# Patient Record
Sex: FEMALE | Race: WHITE | Employment: OTHER | URBAN - METROPOLITAN AREA
[De-identification: names, ages, dates, MRNs, and addresses within clinical notes are randomized per-mention and may not be internally consistent; named-entity substitution may affect disease eponyms.]

---

## 2019-05-30 ENCOUNTER — HOSPITAL ENCOUNTER (OUTPATIENT)
Dept: GENERAL RADIOLOGY | Age: 69
Discharge: HOME | End: 2019-05-30
Payer: MEDICARE

## 2019-05-30 DIAGNOSIS — M79.641 PAIN IN RIGHT HAND: ICD-10-CM

## 2019-05-30 DIAGNOSIS — M25.511 PAIN IN RIGHT SHOULDER: ICD-10-CM

## 2019-05-30 DIAGNOSIS — M25.512 PAIN IN LEFT SHOULDER: ICD-10-CM

## 2019-05-30 DIAGNOSIS — M79.642 PAIN OF LEFT HAND: ICD-10-CM

## 2019-05-30 PROCEDURE — 73030 X-RAY EXAM OF SHOULDER: CPT

## 2019-05-30 PROCEDURE — 73130 X-RAY EXAM OF HAND: CPT

## 2021-05-05 ENCOUNTER — TRANSCRIBE ORDER (OUTPATIENT)
Dept: REGISTRATION | Age: 71
End: 2021-05-05

## 2021-05-05 ENCOUNTER — HOSPITAL ENCOUNTER (OUTPATIENT)
Dept: GENERAL RADIOLOGY | Age: 71
Discharge: HOME | End: 2021-05-05
Payer: MEDICARE

## 2021-05-05 DIAGNOSIS — M25.569 PAIN, KNEE: ICD-10-CM

## 2021-05-05 DIAGNOSIS — M25.569 PAIN, KNEE: Primary | ICD-10-CM

## 2021-05-05 DIAGNOSIS — M25.569 KNEE PAIN: ICD-10-CM

## 2021-05-05 PROCEDURE — 73562 X-RAY EXAM OF KNEE 3: CPT

## 2021-09-21 ENCOUNTER — APPOINTMENT (OUTPATIENT)
Dept: GENERAL RADIOLOGY | Age: 71
DRG: 311 | End: 2021-09-21
Attending: EMERGENCY MEDICINE
Payer: MEDICARE

## 2021-09-21 ENCOUNTER — HOSPITAL ENCOUNTER (INPATIENT)
Age: 71
LOS: 1 days | Discharge: HOME HEALTH | DRG: 311 | End: 2021-09-24
Attending: EMERGENCY MEDICINE | Admitting: FAMILY MEDICINE
Payer: MEDICARE

## 2021-09-21 DIAGNOSIS — R06.02 SOB (SHORTNESS OF BREATH): ICD-10-CM

## 2021-09-21 DIAGNOSIS — I44.7 LBBB (LEFT BUNDLE BRANCH BLOCK): ICD-10-CM

## 2021-09-21 DIAGNOSIS — R07.89 OTHER CHEST PAIN: ICD-10-CM

## 2021-09-21 DIAGNOSIS — E78.2 MIXED HYPERLIPIDEMIA: ICD-10-CM

## 2021-09-21 DIAGNOSIS — E87.6 HYPOKALEMIA: Primary | ICD-10-CM

## 2021-09-21 DIAGNOSIS — I21.4 NSTEMI (NON-ST ELEVATED MYOCARDIAL INFARCTION) (HCC): ICD-10-CM

## 2021-09-21 DIAGNOSIS — R77.8 ELEVATED TROPONIN: ICD-10-CM

## 2021-09-21 DIAGNOSIS — R77.8 TROPONIN LEVEL ELEVATED: ICD-10-CM

## 2021-09-21 DIAGNOSIS — G20 PARKINSON DISEASE (HCC): ICD-10-CM

## 2021-09-21 DIAGNOSIS — I10 ESSENTIAL HYPERTENSION: ICD-10-CM

## 2021-09-21 LAB
ALBUMIN SERPL-MCNC: 4.1 G/DL (ref 3.2–4.6)
ALBUMIN/GLOB SERPL: 1.2 {RATIO} (ref 1.2–3.5)
ALP SERPL-CCNC: 73 U/L (ref 50–130)
ALT SERPL-CCNC: 23 U/L (ref 12–65)
ANION GAP SERPL CALC-SCNC: 11 MMOL/L (ref 7–16)
AST SERPL-CCNC: 19 U/L (ref 15–37)
BASOPHILS # BLD: 0 K/UL (ref 0–0.2)
BASOPHILS NFR BLD: 0 % (ref 0–2)
BILIRUB SERPL-MCNC: 0.3 MG/DL (ref 0.2–1.1)
BUN SERPL-MCNC: 19 MG/DL (ref 8–23)
CALCIUM SERPL-MCNC: 9 MG/DL (ref 8.3–10.4)
CHLORIDE SERPL-SCNC: 103 MMOL/L (ref 98–107)
CO2 SERPL-SCNC: 26 MMOL/L (ref 21–32)
COVID-19 RAPID TEST, COVR: NOT DETECTED
CREAT SERPL-MCNC: 0.91 MG/DL (ref 0.6–1)
D DIMER PPP FEU-MCNC: <0.27 UG/ML(FEU)
DIFFERENTIAL METHOD BLD: NORMAL
EOSINOPHIL # BLD: 0.1 K/UL (ref 0–0.8)
EOSINOPHIL NFR BLD: 1 % (ref 0.5–7.8)
ERYTHROCYTE [DISTWIDTH] IN BLOOD BY AUTOMATED COUNT: 13.3 % (ref 11.9–14.6)
GLOBULIN SER CALC-MCNC: 3.3 G/DL (ref 2.3–3.5)
GLUCOSE SERPL-MCNC: 134 MG/DL (ref 65–100)
HCT VFR BLD AUTO: 42.5 % (ref 35.8–46.3)
HGB BLD-MCNC: 14.3 G/DL (ref 11.7–15.4)
IMM GRANULOCYTES # BLD AUTO: 0 K/UL (ref 0–0.5)
IMM GRANULOCYTES NFR BLD AUTO: 0 % (ref 0–5)
LYMPHOCYTES # BLD: 2.8 K/UL (ref 0.5–4.6)
LYMPHOCYTES NFR BLD: 26 % (ref 13–44)
MAGNESIUM SERPL-MCNC: 2.2 MG/DL (ref 1.8–2.4)
MCH RBC QN AUTO: 30.8 PG (ref 26.1–32.9)
MCHC RBC AUTO-ENTMCNC: 33.6 G/DL (ref 31.4–35)
MCV RBC AUTO: 91.4 FL (ref 79.6–97.8)
MONOCYTES # BLD: 0.7 K/UL (ref 0.1–1.3)
MONOCYTES NFR BLD: 6 % (ref 4–12)
NEUTS SEG # BLD: 7.1 K/UL (ref 1.7–8.2)
NEUTS SEG NFR BLD: 66 % (ref 43–78)
NRBC # BLD: 0 K/UL (ref 0–0.2)
PLATELET # BLD AUTO: 217 K/UL (ref 150–450)
PMV BLD AUTO: 10.3 FL (ref 9.4–12.3)
POTASSIUM SERPL-SCNC: 2.9 MMOL/L (ref 3.5–5.1)
PROT SERPL-MCNC: 7.4 G/DL (ref 6.3–8.2)
RBC # BLD AUTO: 4.65 M/UL (ref 4.05–5.2)
SODIUM SERPL-SCNC: 140 MMOL/L (ref 136–145)
SOURCE, COVRS: NORMAL
TROPONIN-HIGH SENSITIVITY: 163.9 PG/ML (ref 0–14)
TROPONIN-HIGH SENSITIVITY: 184.6 PG/ML (ref 0–14)
WBC # BLD AUTO: 10.7 K/UL (ref 4.3–11.1)

## 2021-09-21 PROCEDURE — 96365 THER/PROPH/DIAG IV INF INIT: CPT

## 2021-09-21 PROCEDURE — 74011250637 HC RX REV CODE- 250/637: Performed by: EMERGENCY MEDICINE

## 2021-09-21 PROCEDURE — 93005 ELECTROCARDIOGRAM TRACING: CPT | Performed by: EMERGENCY MEDICINE

## 2021-09-21 PROCEDURE — 83735 ASSAY OF MAGNESIUM: CPT

## 2021-09-21 PROCEDURE — 85379 FIBRIN DEGRADATION QUANT: CPT

## 2021-09-21 PROCEDURE — 87635 SARS-COV-2 COVID-19 AMP PRB: CPT

## 2021-09-21 PROCEDURE — 85025 COMPLETE CBC W/AUTO DIFF WBC: CPT

## 2021-09-21 PROCEDURE — 71045 X-RAY EXAM CHEST 1 VIEW: CPT

## 2021-09-21 PROCEDURE — 74011250636 HC RX REV CODE- 250/636: Performed by: EMERGENCY MEDICINE

## 2021-09-21 PROCEDURE — 80053 COMPREHEN METABOLIC PANEL: CPT

## 2021-09-21 PROCEDURE — 99285 EMERGENCY DEPT VISIT HI MDM: CPT

## 2021-09-21 PROCEDURE — 84145 PROCALCITONIN (PCT): CPT

## 2021-09-21 PROCEDURE — 96366 THER/PROPH/DIAG IV INF ADDON: CPT

## 2021-09-21 PROCEDURE — 84484 ASSAY OF TROPONIN QUANT: CPT

## 2021-09-21 PROCEDURE — 94762 N-INVAS EAR/PLS OXIMTRY CONT: CPT

## 2021-09-21 RX ORDER — POTASSIUM CHLORIDE 14.9 MG/ML
20 INJECTION INTRAVENOUS
Status: COMPLETED | OUTPATIENT
Start: 2021-09-21 | End: 2021-09-21

## 2021-09-21 RX ORDER — POTASSIUM CHLORIDE 20 MEQ/1
40 TABLET, EXTENDED RELEASE ORAL
Status: COMPLETED | OUTPATIENT
Start: 2021-09-21 | End: 2021-09-21

## 2021-09-21 RX ORDER — SODIUM CHLORIDE 0.9 % (FLUSH) 0.9 %
5-10 SYRINGE (ML) INJECTION AS NEEDED
Status: DISCONTINUED | OUTPATIENT
Start: 2021-09-21 | End: 2021-09-24 | Stop reason: HOSPADM

## 2021-09-21 RX ORDER — SODIUM CHLORIDE 0.9 % (FLUSH) 0.9 %
5-10 SYRINGE (ML) INJECTION EVERY 8 HOURS
Status: DISCONTINUED | OUTPATIENT
Start: 2021-09-21 | End: 2021-09-24 | Stop reason: HOSPADM

## 2021-09-21 RX ORDER — GUAIFENESIN 100 MG/5ML
324 LIQUID (ML) ORAL
Status: DISPENSED | OUTPATIENT
Start: 2021-09-21 | End: 2021-09-22

## 2021-09-21 RX ADMIN — POTASSIUM CHLORIDE 20 MEQ: 14.9 INJECTION, SOLUTION INTRAVENOUS at 20:47

## 2021-09-21 RX ADMIN — POTASSIUM CHLORIDE 40 MEQ: 20 TABLET, EXTENDED RELEASE ORAL at 23:36

## 2021-09-21 RX ADMIN — Medication 5 ML: at 23:38

## 2021-09-21 NOTE — ED PROVIDER NOTES
41-year-old female with history of anxiety, depression, diabetes, CHF, hypertension, Parkinson's disease, polyarthritis presents with complaint of worsening shortness of breath, substernal chest pain without radiation. Patient also reports history of COPD. Patient denies any recent sick contacts. Patient denies receiving COVID-19 vaccine. Rate symptoms is moderate. O2 sat stable at 97% room air. The history is provided by the patient. No  was used. Shortness of Breath  This is a recurrent problem. The problem occurs continuously. Associated symptoms include cough, wheezing and chest pain. Pertinent negatives include no fever, no headaches, no coryza, no rhinorrhea, no sore throat, no swollen glands, no sputum production, no hemoptysis, no PND, no orthopnea, no syncope, no vomiting, no abdominal pain, no rash, no leg pain, no leg swelling and no claudication. Anxiety   Associated symptoms include cough and shortness of breath. Pertinent negatives include no abdominal pain, no claudication, no fever, no headaches, no hemoptysis, no leg pain, no orthopnea, no PND, no sputum production, no syncope and no vomiting. Past Medical History:   Diagnosis Date    Anxiety     Cancer ([facility] [address]     melanoma    Depression     Diabetes ([facility] [address]     Heart failure ([facility] [address]     Hypertension     Parkinson's disease ([facility] [address]     Peripheral neuropathy     Polyarthritis     Sjogren's disease ([facility] [address]        Past Surgical History:   Procedure Laterality Date    HX APPENDECTOMY      HX CHOLECYSTECTOMY      HX TOTAL VAGINAL HYSTERECTOMY           History reviewed. No pertinent family history.     Social History     Socioeconomic History    Marital status:      Spouse name: Not on file    Number of children: Not on file    Years of education: Not on file    Highest education level: Not on file   Occupational History    Not on file   Tobacco Use    Smoking status: Former Smoker    Smokeless tobacco: Never Used   Substance and Sexual Activity    Alcohol use: Not Currently    Drug use: Never    Sexual activity: Not on file   Other Topics Concern    Not on file   Social History Narrative    Not on file     Social Determinants of Health     Financial Resource Strain:     Difficulty of Paying Living Expenses:    Food Insecurity:     Worried About Running Out of Food in the Last Year:     [address] in the Last Year:    Transportation Needs:     Lack of Transportation (Medical):  Lack of Transportation (Non-Medical):    Physical Activity:     Days of Exercise per Week:     Minutes of Exercise per Session:    Stress:     Feeling of Stress :    Social Connections:     Frequency of Communication with Friends and Family:     Frequency of Social Gatherings with Friends and Family:     Attends Je[employer] witness Services:     Active Member of Clubs or Organizations:     Attends Club or Organization Meetings:     Marital Status:    Intimate Partner Violence:     Fear of Current or Ex-Partner:     Emotionally Abused:     Physically Abused:     Sexually Abused: ALLERGIES: Codeine and Prednisone    Review of Systems   Constitutional: Negative for fever. HENT: Negative for rhinorrhea and sore throat. Respiratory: Positive for cough, shortness of breath and wheezing. Negative for hemoptysis and sputum production. Cardiovascular: Positive for chest pain. Negative for orthopnea, claudication, leg swelling, syncope and PND. Gastrointestinal: Negative for abdominal pain and vomiting. Skin: Negative for rash. Neurological: Negative for headaches. Vitals:    09/21/21 1758   BP: (!) 147/65   Pulse: 72   Resp: 14   Temp: 97.5 °F (36.4 °C)   SpO2: 98%   Weight: 68 kg (150 lb)   Height: 5' 3\" (1.6 m)            Physical Exam  Vitals and nursing note reviewed. Constitutional:       Appearance: She is well-developed. HENT:      Head: Normocephalic.       Mouth/Throat: Mouth: Mucous membranes are moist.   Eyes:      Extraocular Movements: Extraocular movements intact. Pupils: Pupils are equal, round, and reactive to light. Cardiovascular:      Rate and Rhythm: Normal rate. Pulses: Normal pulses. Heart sounds: Normal heart sounds. Pulmonary:      Effort: Pulmonary effort is normal.      Breath sounds: Normal breath sounds. Abdominal:      General: Bowel sounds are normal.      Palpations: Abdomen is soft. Tenderness: There is no abdominal tenderness. Musculoskeletal:         General: Normal range of motion. Cervical back: Normal range of motion. Comments: No LE edema. No calf TTP. Skin:     General: Skin is warm. Findings: No erythema. Neurological:      General: No focal deficit present. Mental Status: She is alert and oriented to person, place, and time. Motor: No weakness. MDM  Number of Diagnoses or Management Options  Hypokalemia: new and requires workup  LBBB (left bundle branch block)  SOB (shortness of breath): new and requires workup  Diagnosis management comments: EKG with left bundle branch block. No previous EKG for comparison. Initial troponin 184.6. Repeat 2-hour troponin trending down to 164. Patient poor historian. Denies any active chest pain. Vital signs stable. O2 sat 97% on room air. Potassium noted to be 2.9. Will replete with IV supplementation. Chest x-ray clear. Covid swab negative. D-dimer unremarkable. Will discuss case with cardiology. Aspirin ordered. Dr. Bernabe Connor on call for Cardiology.  Requests Hospitalist admit and someone from Cards team can see in am.        Amount and/or Complexity of Data Reviewed  Clinical lab tests: ordered and reviewed  Tests in the radiology section of CPT®: ordered and reviewed  Tests in the medicine section of CPT®: ordered and reviewed  Review and summarize past medical records: yes  Discuss the patient with other providers: yes  Independent visualization of images, tracings, or specimens: yes    Risk of Complications, Morbidity, and/or Mortality  Presenting problems: high  Diagnostic procedures: moderate  Management options: moderate    Patient Progress  Patient progress: stable    ED Course as of Sep 21 2226   Tue Sep 21, 2021   1922 Potassium(!): 2.9 [DF]   1928 CXR Findings:    Lungs/pleura:No evidence of consolidation or effusion. Small granulomas  Vascularity:Normal Vasculature  Cardiac:Normal size cardiac silhouette  Mediastinum:Aortic calcifications  Bones:No obvious acute fracture     IMPRESSION:   No acute process      [DF]   1934 Troponin-High Sensitivity(!!): 184.6 [DF]   2124 COVID-19 rapid test: Not detected [DF]   2124 D DIMER: <0.27 [DF]   2225 Troponin-High Sensitivity(!!): 163.9 [DF]      ED Course User Index  [DF] Elodia Zapata MD       EKG    Date/Time: 9/21/2021 6:06 PM  Performed by: Elodia Zapata MD  Authorized by:  Elodia Zapata MD     ECG reviewed by ED Physician in the absence of a cardiologist: yes    Rate:     ECG rate:  68    ECG rate assessment: normal    Rhythm:     Rhythm: sinus rhythm    Ectopy:     Ectopy: none    QRS:     QRS axis:  Left  Conduction:     Conduction: abnormal      Abnormal conduction: complete LBBB                     Results Include:    Recent Results (from the past 24 hour(s))   EKG, 12 LEAD, INITIAL    Collection Time: 09/21/21  5:57 PM   Result Value Ref Range    Ventricular Rate 68 BPM    Atrial Rate 68 BPM    P-R Interval 148 ms    QRS Duration 132 ms    Q-T Interval 462 ms    QTC Calculation (Bezet) 491 ms    Calculated P Axis 36 degrees    Calculated R Axis -37 degrees    Calculated T Axis 119 degrees    Diagnosis       Normal sinus rhythm  Left axis deviation  Left bundle branch block  Abnormal ECG  No previous ECGs available     CBC WITH AUTOMATED DIFF    Collection Time: 09/21/21  6:13 PM   Result Value Ref Range    WBC 10.7 4.3 - 11.1 K/uL    RBC 4.65 4.05 - 5.2 M/uL    HGB 14.3 11.7 - 15.4 g/dL    HCT 42.5 35.8 - 46.3 %    MCV 91.4 79.6 - 97.8 FL    MCH 30.8 26.1 - 32.9 PG    MCHC 33.6 31.4 - 35.0 g/dL    RDW 13.3 11.9 - 14.6 %    PLATELET 726 195 - 689 K/uL    MPV 10.3 9.4 - 12.3 FL    ABSOLUTE NRBC 0.00 0.0 - 0.2 K/uL    DF AUTOMATED      NEUTROPHILS 66 43 - 78 %    LYMPHOCYTES 26 13 - 44 %    MONOCYTES 6 4.0 - 12.0 %    EOSINOPHILS 1 0.5 - 7.8 %    BASOPHILS 0 0.0 - 2.0 %    IMMATURE GRANULOCYTES 0 0.0 - 5.0 %    ABS. NEUTROPHILS 7.1 1.7 - 8.2 K/UL    ABS. LYMPHOCYTES 2.8 0.5 - 4.6 K/UL    ABS. MONOCYTES 0.7 0.1 - 1.3 K/UL    ABS. EOSINOPHILS 0.1 0.0 - 0.8 K/UL    ABS. BASOPHILS 0.0 0.0 - 0.2 K/UL    ABS. IMM. GRANS. 0.0 0.0 - 0.5 K/UL   METABOLIC PANEL, COMPREHENSIVE    Collection Time: 09/21/21  6:13 PM   Result Value Ref Range    Sodium 140 136 - 145 mmol/L    Potassium 2.9 (L) 3.5 - 5.1 mmol/L    Chloride 103 98 - 107 mmol/L    CO2 26 21 - 32 mmol/L    Anion gap 11 7 - 16 mmol/L    Glucose 134 (H) 65 - 100 mg/dL    BUN 19 8 - 23 MG/DL    Creatinine 0.91 0.6 - 1.0 MG/DL    GFR est AA >60 >60 ml/min/1.73m2    GFR est non-AA >60 >60 ml/min/1.73m2    Calcium 9.0 8.3 - 10.4 MG/DL    Bilirubin, total 0.3 0.2 - 1.1 MG/DL    ALT (SGPT) 23 12 - 65 U/L    AST (SGOT) 19 15 - 37 U/L    Alk. phosphatase 73 50 - 130 U/L    Protein, total 7.4 6.3 - 8.2 g/dL    Albumin 4.1 3.2 - 4.6 g/dL    Globulin 3.3 2.3 - 3.5 g/dL    A-G Ratio 1.2 1.2 - 3.5     MAGNESIUM    Collection Time: 09/21/21  6:13 PM   Result Value Ref Range    Magnesium 2.2 1.8 - 2.4 mg/dL     Rusty Newsome MD; 9/21/2021 @7:26 PM Voice dictation software was used during the making of this note. This software is not perfect and grammatical and other typographical errors may be present.   This note has not been proofread for errors.  ===================================================================

## 2021-09-21 NOTE — ED TRIAGE NOTES
Pt presents to ED via EMS from home c/o shortness of breath for less than 24 hours. Hx of COPD/HTN/Anxiety. Pt speaking complete sentences. No retractions/nasal flaring noted. O2 RA sat 100%. Afebrile. EMS BP: 131/69, HR 73, LBBB.

## 2021-09-22 ENCOUNTER — APPOINTMENT (OUTPATIENT)
Dept: NON INVASIVE DIAGNOSTICS | Age: 71
DRG: 311 | End: 2021-09-22
Attending: INTERNAL MEDICINE
Payer: MEDICARE

## 2021-09-22 PROBLEM — R07.9 CHEST PAIN: Status: ACTIVE | Noted: 2021-09-22

## 2021-09-22 PROBLEM — E11.9 DM (DIABETES MELLITUS) (HCC): Status: ACTIVE | Noted: 2021-09-22

## 2021-09-22 PROBLEM — I10 HTN (HYPERTENSION): Status: ACTIVE | Noted: 2021-09-22

## 2021-09-22 PROBLEM — G20 PARKINSON DISEASE (HCC): Status: ACTIVE | Noted: 2021-09-22

## 2021-09-22 PROBLEM — E78.5 HLD (HYPERLIPIDEMIA): Status: ACTIVE | Noted: 2021-09-22

## 2021-09-22 PROBLEM — R32 URINARY INCONTINENCE: Status: ACTIVE | Noted: 2021-09-22

## 2021-09-22 PROBLEM — I24.8 DEMAND ISCHEMIA (HCC): Status: ACTIVE | Noted: 2021-09-22

## 2021-09-22 LAB
ANION GAP SERPL CALC-SCNC: 6 MMOL/L (ref 7–16)
ATRIAL RATE: 68 BPM
B PERT DNA SPEC QL NAA+PROBE: NOT DETECTED
BORDETELLA PARAPERTUSSIS PCR, BORPAR: NOT DETECTED
BUN SERPL-MCNC: 15 MG/DL (ref 8–23)
C PNEUM DNA SPEC QL NAA+PROBE: NOT DETECTED
CALCIUM SERPL-MCNC: 8.6 MG/DL (ref 8.3–10.4)
CALCULATED P AXIS, ECG09: 36 DEGREES
CALCULATED R AXIS, ECG10: -37 DEGREES
CALCULATED T AXIS, ECG11: 119 DEGREES
CHLORIDE SERPL-SCNC: 108 MMOL/L (ref 98–107)
CO2 SERPL-SCNC: 27 MMOL/L (ref 21–32)
CREAT SERPL-MCNC: 0.69 MG/DL (ref 0.6–1)
DIAGNOSIS, 93000: NORMAL
ECHO AO ASC DIAM: 2.8 CM
ECHO AO ROOT DIAM: 2.8 CM
ECHO AV AREA PEAK VELOCITY: 1.65 CM2
ECHO AV AREA VTI: 1.6 CM2
ECHO AV AREA/BSA PEAK VELOCITY: 1 CM2/M2
ECHO AV AREA/BSA VTI: 0.9 CM2/M2
ECHO AV MEAN GRADIENT: 4 MMHG
ECHO AV PEAK GRADIENT: 7 MMHG
ECHO AV PEAK VELOCITY: 135 CM/S
ECHO AV VTI: 28.5 CM
ECHO EST RA PRESSURE: 3 MMHG
ECHO LA AREA 2C: 19 CM2
ECHO LA AREA 4C: 20.9 CM2
ECHO LA MAJOR AXIS: 5.9 CM
ECHO LA MINOR AXIS: 3.45 CM
ECHO LV E' LATERAL VELOCITY: 8.19 CM/S
ECHO LV E' SEPTAL VELOCITY: 5.65 CM/S
ECHO LV EDV A4C: 84 CM3
ECHO LV ESV A4C: 33 CM3
ECHO LV INTERNAL DIMENSION DIASTOLIC: 4.21 CM (ref 3.9–5.3)
ECHO LV INTERNAL DIMENSION SYSTOLIC: 3.15 CM
ECHO LV IVSD: 1.04 CM (ref 0.6–0.9)
ECHO LV MASS 2D: 146.6 G (ref 67–162)
ECHO LV MASS INDEX 2D: 85.7 G/M2 (ref 43–95)
ECHO LV POSTERIOR WALL DIASTOLIC: 1.05 CM (ref 0.6–0.9)
ECHO LVOT DIAM: 1.8 CM
ECHO LVOT PEAK GRADIENT: 3 MMHG
ECHO LVOT VTI: 18 CM
ECHO MV A VELOCITY: 59.7 CM/S
ECHO MV E DECELERATION TIME (DT): 169 MS
ECHO MV E VELOCITY: 104 CM/S
ECHO MV E/A RATIO: 1.74
ECHO MV E/E' LATERAL: 12.7
ECHO MV E/E' RATIO (AVERAGED): 15.55
ECHO MV E/E' SEPTAL: 18.41
ECHO RIGHT VENTRICULAR SYSTOLIC PRESSURE (RVSP): 27 MMHG
ECHO RV INTERNAL DIMENSION: 2.92 CM
ECHO RV TAPSE: 1.7 CM (ref 1.5–2)
ECHO TV REGURGITANT MAX VELOCITY: 243 CM/S
ECHO TV REGURGITANT PEAK GRADIENT: 24 MMHG
FLUAV SUBTYP SPEC NAA+PROBE: NOT DETECTED
FLUBV RNA SPEC QL NAA+PROBE: NOT DETECTED
GLUCOSE SERPL-MCNC: 113 MG/DL (ref 65–100)
HADV DNA SPEC QL NAA+PROBE: NOT DETECTED
HCOV 229E RNA SPEC QL NAA+PROBE: NOT DETECTED
HCOV HKU1 RNA SPEC QL NAA+PROBE: NOT DETECTED
HCOV NL63 RNA SPEC QL NAA+PROBE: NOT DETECTED
HCOV OC43 RNA SPEC QL NAA+PROBE: NOT DETECTED
HMPV RNA SPEC QL NAA+PROBE: NOT DETECTED
HPIV1 RNA SPEC QL NAA+PROBE: NOT DETECTED
HPIV2 RNA SPEC QL NAA+PROBE: NOT DETECTED
HPIV3 RNA SPEC QL NAA+PROBE: NOT DETECTED
HPIV4 RNA SPEC QL NAA+PROBE: NOT DETECTED
M PNEUMO DNA SPEC QL NAA+PROBE: NOT DETECTED
P-R INTERVAL, ECG05: 148 MS
POTASSIUM SERPL-SCNC: 4.1 MMOL/L (ref 3.5–5.1)
PROCALCITONIN SERPL-MCNC: <0.05 NG/ML
Q-T INTERVAL, ECG07: 462 MS
QRS DURATION, ECG06: 132 MS
QTC CALCULATION (BEZET), ECG08: 491 MS
RSV RNA SPEC QL NAA+PROBE: NOT DETECTED
RV+EV RNA SPEC QL NAA+PROBE: NOT DETECTED
SARS-COV-2 PCR, COVPCR: NOT DETECTED
SODIUM SERPL-SCNC: 141 MMOL/L (ref 136–145)
TROPONIN-HIGH SENSITIVITY: 199.7 PG/ML (ref 0–14)
VENTRICULAR RATE, ECG03: 68 BPM

## 2021-09-22 PROCEDURE — 96372 THER/PROPH/DIAG INJ SC/IM: CPT

## 2021-09-22 PROCEDURE — 80048 BASIC METABOLIC PNL TOTAL CA: CPT

## 2021-09-22 PROCEDURE — 74011250636 HC RX REV CODE- 250/636: Performed by: INTERNAL MEDICINE

## 2021-09-22 PROCEDURE — 99218 HC RM OBSERVATION: CPT

## 2021-09-22 PROCEDURE — 84484 ASSAY OF TROPONIN QUANT: CPT

## 2021-09-22 PROCEDURE — 97530 THERAPEUTIC ACTIVITIES: CPT

## 2021-09-22 PROCEDURE — 74011000250 HC RX REV CODE- 250: Performed by: FAMILY MEDICINE

## 2021-09-22 PROCEDURE — 74011250636 HC RX REV CODE- 250/636: Performed by: FAMILY MEDICINE

## 2021-09-22 PROCEDURE — 97165 OT EVAL LOW COMPLEX 30 MIN: CPT

## 2021-09-22 PROCEDURE — 97161 PT EVAL LOW COMPLEX 20 MIN: CPT

## 2021-09-22 PROCEDURE — 74011250637 HC RX REV CODE- 250/637: Performed by: INTERNAL MEDICINE

## 2021-09-22 PROCEDURE — 97535 SELF CARE MNGMENT TRAINING: CPT

## 2021-09-22 PROCEDURE — 0202U NFCT DS 22 TRGT SARS-COV-2: CPT

## 2021-09-22 RX ORDER — METFORMIN HYDROCHLORIDE 500 MG/1
500 TABLET ORAL 2 TIMES DAILY WITH MEALS
COMMUNITY

## 2021-09-22 RX ORDER — MONTELUKAST SODIUM 10 MG/1
10 TABLET ORAL
COMMUNITY

## 2021-09-22 RX ORDER — POLYETHYLENE GLYCOL 3350 17 G/17G
17 POWDER, FOR SOLUTION ORAL DAILY PRN
Status: DISCONTINUED | OUTPATIENT
Start: 2021-09-22 | End: 2021-09-24 | Stop reason: HOSPADM

## 2021-09-22 RX ORDER — ALBUTEROL SULFATE 1.25 MG/3ML
1.25 SOLUTION RESPIRATORY (INHALATION)
COMMUNITY

## 2021-09-22 RX ORDER — AMLODIPINE BESYLATE 10 MG/1
10 TABLET ORAL DAILY
COMMUNITY

## 2021-09-22 RX ORDER — INDAPAMIDE 1.25 MG/1
1.25 TABLET, FILM COATED ORAL DAILY
COMMUNITY

## 2021-09-22 RX ORDER — METOPROLOL SUCCINATE 50 MG/1
50 TABLET, EXTENDED RELEASE ORAL DAILY
Status: DISCONTINUED | OUTPATIENT
Start: 2021-09-22 | End: 2021-09-24 | Stop reason: HOSPADM

## 2021-09-22 RX ORDER — DIPHENHYDRAMINE HCL 25 MG
25 CAPSULE ORAL
COMMUNITY

## 2021-09-22 RX ORDER — SPIRONOLACTONE 25 MG/1
25 TABLET ORAL DAILY
COMMUNITY

## 2021-09-22 RX ORDER — ENOXAPARIN SODIUM 100 MG/ML
40 INJECTION SUBCUTANEOUS DAILY
Status: DISCONTINUED | OUTPATIENT
Start: 2021-09-22 | End: 2021-09-24 | Stop reason: HOSPADM

## 2021-09-22 RX ORDER — ALBUTEROL SULFATE 90 UG/1
2 AEROSOL, METERED RESPIRATORY (INHALATION)
COMMUNITY

## 2021-09-22 RX ORDER — ACETAMINOPHEN 650 MG/1
650 SUPPOSITORY RECTAL
Status: DISCONTINUED | OUTPATIENT
Start: 2021-09-22 | End: 2021-09-24 | Stop reason: HOSPADM

## 2021-09-22 RX ORDER — OXYBUTYNIN CHLORIDE 10 MG/1
10 TABLET, EXTENDED RELEASE ORAL DAILY
COMMUNITY

## 2021-09-22 RX ORDER — ONDANSETRON 2 MG/ML
4 INJECTION INTRAMUSCULAR; INTRAVENOUS
Status: DISCONTINUED | OUTPATIENT
Start: 2021-09-22 | End: 2021-09-24 | Stop reason: HOSPADM

## 2021-09-22 RX ORDER — AMLODIPINE BESYLATE 10 MG/1
10 TABLET ORAL DAILY
Status: DISCONTINUED | OUTPATIENT
Start: 2021-09-22 | End: 2021-09-24 | Stop reason: HOSPADM

## 2021-09-22 RX ORDER — MELATONIN
1000 DAILY
COMMUNITY

## 2021-09-22 RX ORDER — ONDANSETRON 4 MG/1
4 TABLET, ORALLY DISINTEGRATING ORAL
Status: DISCONTINUED | OUTPATIENT
Start: 2021-09-22 | End: 2021-09-24 | Stop reason: HOSPADM

## 2021-09-22 RX ORDER — NITROGLYCERIN 0.3 MG/1
0.3 TABLET SUBLINGUAL
COMMUNITY

## 2021-09-22 RX ORDER — ATORVASTATIN CALCIUM 40 MG/1
40 TABLET, FILM COATED ORAL
Status: DISCONTINUED | OUTPATIENT
Start: 2021-09-22 | End: 2021-09-24 | Stop reason: HOSPADM

## 2021-09-22 RX ORDER — ACETAMINOPHEN 325 MG/1
650 TABLET ORAL
Status: DISCONTINUED | OUTPATIENT
Start: 2021-09-22 | End: 2021-09-24 | Stop reason: HOSPADM

## 2021-09-22 RX ORDER — CARBIDOPA AND LEVODOPA 25; 100 MG/1; MG/1
0.5 TABLET ORAL 3 TIMES DAILY
COMMUNITY

## 2021-09-22 RX ORDER — DICLOFENAC SODIUM 10 MG/G
GEL TOPICAL 4 TIMES DAILY
COMMUNITY

## 2021-09-22 RX ORDER — METHOCARBAMOL 750 MG/1
750 TABLET, FILM COATED ORAL AS NEEDED
COMMUNITY

## 2021-09-22 RX ORDER — ALBUTEROL SULFATE 0.83 MG/ML
1.25 SOLUTION RESPIRATORY (INHALATION)
Status: DISCONTINUED | OUTPATIENT
Start: 2021-09-22 | End: 2021-09-24 | Stop reason: HOSPADM

## 2021-09-22 RX ORDER — IPRATROPIUM BROMIDE AND ALBUTEROL SULFATE 2.5; .5 MG/3ML; MG/3ML
3 SOLUTION RESPIRATORY (INHALATION)
COMMUNITY

## 2021-09-22 RX ORDER — GABAPENTIN 300 MG/1
300 CAPSULE ORAL 2 TIMES DAILY
Status: DISCONTINUED | OUTPATIENT
Start: 2021-09-22 | End: 2021-09-24 | Stop reason: HOSPADM

## 2021-09-22 RX ORDER — SODIUM CHLORIDE 0.9 % (FLUSH) 0.9 %
5-40 SYRINGE (ML) INJECTION EVERY 8 HOURS
Status: DISCONTINUED | OUTPATIENT
Start: 2021-09-22 | End: 2021-09-24 | Stop reason: HOSPADM

## 2021-09-22 RX ORDER — METOPROLOL SUCCINATE 50 MG/1
50 TABLET, EXTENDED RELEASE ORAL DAILY
COMMUNITY

## 2021-09-22 RX ORDER — GABAPENTIN 300 MG/1
300 CAPSULE ORAL 2 TIMES DAILY
COMMUNITY

## 2021-09-22 RX ORDER — POTASSIUM CHLORIDE 20 MEQ/1
40 TABLET, EXTENDED RELEASE ORAL
Status: COMPLETED | OUTPATIENT
Start: 2021-09-22 | End: 2021-09-22

## 2021-09-22 RX ORDER — ONDANSETRON 4 MG/1
4 TABLET, FILM COATED ORAL
COMMUNITY

## 2021-09-22 RX ORDER — ATORVASTATIN CALCIUM 40 MG/1
40 TABLET, FILM COATED ORAL
COMMUNITY

## 2021-09-22 RX ORDER — AZITHROMYCIN 250 MG/1
500 TABLET, FILM COATED ORAL DAILY
Status: DISCONTINUED | OUTPATIENT
Start: 2021-09-22 | End: 2021-09-22

## 2021-09-22 RX ORDER — CARBIDOPA AND LEVODOPA 25; 100 MG/1; MG/1
0.5 TABLET ORAL 3 TIMES DAILY
Status: DISCONTINUED | OUTPATIENT
Start: 2021-09-22 | End: 2021-09-24 | Stop reason: HOSPADM

## 2021-09-22 RX ORDER — SODIUM CHLORIDE 0.9 % (FLUSH) 0.9 %
5-40 SYRINGE (ML) INJECTION AS NEEDED
Status: DISCONTINUED | OUTPATIENT
Start: 2021-09-22 | End: 2021-09-24 | Stop reason: HOSPADM

## 2021-09-22 RX ADMIN — Medication 10 ML: at 06:46

## 2021-09-22 RX ADMIN — PERFLUTREN 1 ML: 6.52 INJECTION, SUSPENSION INTRAVENOUS at 14:51

## 2021-09-22 RX ADMIN — CARBIDOPA AND LEVODOPA 0.5 TABLET: 25; 100 TABLET ORAL at 09:42

## 2021-09-22 RX ADMIN — ATORVASTATIN CALCIUM 40 MG: 40 TABLET, FILM COATED ORAL at 21:00

## 2021-09-22 RX ADMIN — Medication 10 ML: at 21:00

## 2021-09-22 RX ADMIN — POTASSIUM CHLORIDE 40 MEQ: 20 TABLET, EXTENDED RELEASE ORAL at 00:48

## 2021-09-22 RX ADMIN — CARBIDOPA AND LEVODOPA 0.5 TABLET: 25; 100 TABLET ORAL at 21:00

## 2021-09-22 RX ADMIN — GABAPENTIN 300 MG: 300 CAPSULE ORAL at 21:00

## 2021-09-22 RX ADMIN — AMLODIPINE BESYLATE 10 MG: 10 TABLET ORAL at 09:42

## 2021-09-22 RX ADMIN — METOPROLOL SUCCINATE 50 MG: 50 TABLET, EXTENDED RELEASE ORAL at 09:42

## 2021-09-22 RX ADMIN — ENOXAPARIN SODIUM 40 MG: 40 INJECTION SUBCUTANEOUS at 09:41

## 2021-09-22 RX ADMIN — CARBIDOPA AND LEVODOPA 0.5 TABLET: 25; 100 TABLET ORAL at 17:07

## 2021-09-22 RX ADMIN — GABAPENTIN 300 MG: 300 CAPSULE ORAL at 09:42

## 2021-09-22 NOTE — H&P
HOSPITALIST H&P/CONSULT  NAME:  Fran Pace   Age:  70 y.o.  :   1950   MRN:   [id number]  PCP: Cece Willis MD  Consulting MD:  Treatment Team: Attending Provider: Caroline Wagoner DO; Primary Nurse: Jamey Sosa RN  HPI:     71 yo CF with past history of DM type II, HTN, HLD, PD, urinary incontinance, MDD presents with chest discomfort and shortness of breath that has been going on for less than one day. Her chest pain is in the center (patient pointed) and feels dull and does not radiate. She does not know anything that makes it worse. No cough. No sick contacts including patients with COVID. No fevers/chills, abdominal pain, nausea/vomiting, or diarrhea. Per chart review she recently completed a course of Macrobid for uncomplicated cystitis. ED course: EKG shows NSR with LBBB. Troponin of 184 with repeat of 163. K of 2.9. D dimer is negative. RPP is negative including COVID. Procalcitonin is negative. Given several doses of potassium. Discussed with cardiology who recommends hospitalist admission and will see in the morning. Complete ROS done and is as stated in HPI or otherwise negative  Past Medical History:   Diagnosis Date    Anxiety     Cancer ([facility] [address]     melanoma    Depression     Diabetes ([facility] [address]     Heart failure ([facility] [address]     Hypertension     Parkinson's disease ([facility] [address]     Peripheral neuropathy     Polyarthritis     Sjogren's disease ([facility] [address]       Past Surgical History:   Procedure Laterality Date    HX APPENDECTOMY      HX CHOLECYSTECTOMY      HX TOTAL VAGINAL HYSTERECTOMY        None     Allergies   Allergen Reactions    Codeine Nausea and Vomiting    Prednisone Rash      Social History     Tobacco Use    Smoking status: Former Smoker    Smokeless tobacco: Never Used   Substance Use Topics    Alcohol use: Not Currently      History reviewed. No pertinent family history.    Objective:     Visit Vitals  /60   Pulse 71   Temp 97.5 °F (36.4 °C)   Resp 11   Ht 5' 3\" (1.6 m)   Wt 68 kg (150 lb)   SpO2 96%   BMI 26.57 kg/m²      Temp (24hrs), Av.5 °F (36.4 °C), Min:97.5 °F (36.4 °C), Max:97.5 °F (36.4 °C)    Oxygen Therapy  O2 Sat (%): 96 % (21 0045)  Pulse via Oximetry: 66 beats per minute (21 1900)  O2 Device: None (Room air) (21 1758)  Physical Exam:  General:    Alert, cooperative, no distress, appears stated age. Head:   Normocephalic, without obvious abnormality, atraumatic. Nose:  Nares normal. No drainage or sinus tenderness. Lungs:   Clear to auscultation bilaterally. No Wheezing or Rhonchi. No rales. Heart:   Regular rate and rhythm, +S1, +S2  Abdomen:   Soft, non-tender. Not distended. Bowel sounds normal.   Extremities: No cyanosis. No edema. No clubbing  Skin:     Texture, turgor normal. No rashes or lesions.   Not Jaundiced  Neurologic: Alert and oriented x 3, no focal deficits   Data Review:   Recent Results (from the past 24 hour(s))   EKG, 12 LEAD, INITIAL    Collection Time: 21  5:57 PM   Result Value Ref Range    Ventricular Rate 68 BPM    Atrial Rate 68 BPM    P-R Interval 148 ms    QRS Duration 132 ms    Q-T Interval 462 ms    QTC Calculation (Bezet) 491 ms    Calculated P Axis 36 degrees    Calculated R Axis -37 degrees    Calculated T Axis 119 degrees    Diagnosis       Normal sinus rhythm  Left axis deviation  Left bundle branch block  Abnormal ECG  No previous ECGs available     CBC WITH AUTOMATED DIFF    Collection Time: 21  6:13 PM   Result Value Ref Range    WBC 10.7 4.3 - 11.1 K/uL    RBC 4.65 4.05 - 5.2 M/uL    HGB 14.3 11.7 - 15.4 g/dL    HCT 42.5 35.8 - 46.3 %    MCV 91.4 79.6 - 97.8 FL    MCH 30.8 26.1 - 32.9 PG    MCHC 33.6 31.4 - 35.0 g/dL    RDW 13.3 11.9 - 14.6 %    PLATELET 301 461 - 168 K/uL    MPV 10.3 9.4 - 12.3 FL    ABSOLUTE NRBC 0.00 0.0 - 0.2 K/uL    DF AUTOMATED      NEUTROPHILS 66 43 - 78 %    LYMPHOCYTES 26 13 - 44 %    MONOCYTES 6 4.0 - 12.0 %    EOSINOPHILS 1 0.5 - 7.8 % BASOPHILS 0 0.0 - 2.0 %    IMMATURE GRANULOCYTES 0 0.0 - 5.0 %    ABS. NEUTROPHILS 7.1 1.7 - 8.2 K/UL    ABS. LYMPHOCYTES 2.8 0.5 - 4.6 K/UL    ABS. MONOCYTES 0.7 0.1 - 1.3 K/UL    ABS. EOSINOPHILS 0.1 0.0 - 0.8 K/UL    ABS. BASOPHILS 0.0 0.0 - 0.2 K/UL    ABS. IMM. GRANS. 0.0 0.0 - 0.5 K/UL   METABOLIC PANEL, COMPREHENSIVE    Collection Time: 09/21/21  6:13 PM   Result Value Ref Range    Sodium 140 136 - 145 mmol/L    Potassium 2.9 (L) 3.5 - 5.1 mmol/L    Chloride 103 98 - 107 mmol/L    CO2 26 21 - 32 mmol/L    Anion gap 11 7 - 16 mmol/L    Glucose 134 (H) 65 - 100 mg/dL    BUN 19 8 - 23 MG/DL    Creatinine 0.91 0.6 - 1.0 MG/DL    GFR est AA >60 >60 ml/min/1.73m2    GFR est non-AA >60 >60 ml/min/1.73m2    Calcium 9.0 8.3 - 10.4 MG/DL    Bilirubin, total 0.3 0.2 - 1.1 MG/DL    ALT (SGPT) 23 12 - 65 U/L    AST (SGOT) 19 15 - 37 U/L    Alk.  phosphatase 73 50 - 130 U/L    Protein, total 7.4 6.3 - 8.2 g/dL    Albumin 4.1 3.2 - 4.6 g/dL    Globulin 3.3 2.3 - 3.5 g/dL    A-G Ratio 1.2 1.2 - 3.5     MAGNESIUM    Collection Time: 09/21/21  6:13 PM   Result Value Ref Range    Magnesium 2.2 1.8 - 2.4 mg/dL   TROPONIN-HIGH SENSITIVITY    Collection Time: 09/21/21  6:13 PM   Result Value Ref Range    Troponin-High Sensitivity 184.6 (HH) 0 - 14 pg/mL   D DIMER    Collection Time: 09/21/21  6:13 PM   Result Value Ref Range    D DIMER <0.27 <0.56 ug/ml(FEU)   PROCALCITONIN    Collection Time: 09/21/21  6:13 PM   Result Value Ref Range    Procalcitonin <0.05 ng/mL   COVID-19 RAPID TEST    Collection Time: 09/21/21  8:51 PM   Result Value Ref Range    Specimen source NASAL SWAB      COVID-19 rapid test Not detected NOTD     TROPONIN-HIGH SENSITIVITY    Collection Time: 09/21/21  8:51 PM   Result Value Ref Range    Troponin-High Sensitivity 163.9 (HH) 0 - 14 pg/mL   RESPIRATORY VIRUS PANEL W/COVID-19, PCR    Collection Time: 09/22/21 12:46 AM    Specimen: Nasopharyngeal   Result Value Ref Range    Adenovirus NOT DETECTED NOTDET Coronavirus 229E NOT DETECTED NOTDET      Coronavirus HKU1 NOT DETECTED NOTDET      Coronavirus CVNL63 NOT DETECTED NOTDET      Coronavirus OC43 NOT DETECTED NOTDET      SARS-CoV-2, PCR NOT DETECTED NOTDET      Metapneumovirus NOT DETECTED NOTDET      Rhinovirus and Enterovirus NOT DETECTED NOTDET      Influenza A NOT DETECTED NOTDET      Influenza B NOT DETECTED NOTDET      Parainfluenza 1 NOT DETECTED NOTDET      Parainfluenza 2 NOT DETECTED NOTDET      Parainfluenza 3 NOT DETECTED NOTDET      Parainfluenza virus 4 NOT DETECTED NOTDET      RSV by PCR NOT DETECTED NOTDET      B. parapertussis, PCR NOT DETECTED NOTDET      Bordetella pertussis - PCR NOT DETECTED NOTDET      Chlamydophila pneumoniae DNA, QL, PCR NOT DETECTED NOTDET      Mycoplasma pneumoniae DNA, QL, PCR NOT DETECTED NOTDET       Imaging /Procedures /Studies     Assessment and Plan: Active Hospital Problems    Diagnosis Date Noted    Chest pain 09/22/2021    Demand ischemia ([facility] [address] 09/22/2021    Parkinson disease ([facility] [address] 09/22/2021    HTN (hypertension) 09/22/2021    DM (diabetes mellitus) ([facility] [address] 09/22/2021    HLD (hyperlipidemia) 09/22/2021    Urinary incontinence 09/22/2021       PLAN    # Chest pain, seems atypical in nature  - EKG shows LBBB but this was present on previous EKGs  - patient with risk factors for CAD  - trend trops  - get TTE in the morning  - cardiology to see in the morning  - procalcitonin negative, no indication for antibiotics  - RPP (and COVID) are negative  - jet nebs as needed  - d dimer is negative     # HTN  - home meds    # HLD  - home meds    # PD   - home meds    F/E/N: no fluids, replete electrolytes as needed, regular diet    Ppx: Lovenox for VTE    Code Status: FULL CODE    Disposition: Admit to OBS with plan as above. Likely discharge home later today. Discussed with patient at bedside. All questions answered.      Signed By: Meaghan Wang DO     September 22, 2021

## 2021-09-22 NOTE — PROGRESS NOTES
Problem: Falls - Risk of  Goal: *Absence of Falls  Description: Document Star  Fall Risk and appropriate interventions in the flowsheet.   Outcome: Progressing Towards Goal  Note: Fall Risk Interventions:  Mobility Interventions: Patient to call before getting OOB         Medication Interventions: Bed/chair exit alarm                   Problem: Patient Education: Go to Patient Education Activity  Goal: Patient/Family Education  Outcome: Progressing Towards Goal

## 2021-09-22 NOTE — PROGRESS NOTES
Problem: Self Care Deficits Care Plan (Adult)  Goal: *Acute Goals and Plan of Care (Insert Text)  Outcome: Progressing Towards Goal  Note: 1. Patient will perform grooming with supervision. 2. Patient will perform Upper body dressing with supervision. 3. Patient will perform lower body dressing with  SBA. 4. Patient will perform upper and lower body bathing with SBA. 5. Patient will perform toilet transfers with SBA. 6. Patient will perform shower transfer with SBA. 7. Patient will participate in 30 + minutes of ADL/ therapeutic exercise/therapeutic activity with min rest breaks to increase activity tolerance for self care. 8. Patient will perform ADL functional mobility in room with SBA. Goals to be achieved in 7 days. OCCUPATIONAL THERAPY: Initial Assessment and AM 9/22/2021  OBSERVATION: OT Visit Days: 1  [place] / [place] / Product Type: Medicare /      NAME/AGE/GENDER: Cyndi Wells is a 70 y.o. female   PRIMARY DIAGNOSIS:  Chest pain [R07.9] Chest pain Chest pain        ICD-10: Treatment Diagnosis:    Generalized Muscle Weakness (M62.81)  Other lack of cordination (R27.8)  Difficulty in walking, Not elsewhere classified (R26.2)  Other abnormalities of gait and mobility (R26.89)   Precautions/Allergies:     Codeine and Prednisone      ASSESSMENT:     Ms. Carmelita Bay presents supine in bed with above diagnosis. She is very drowsy but cooperative. She lives with her sister in a trailer and is mod I at baseline using a rollator. She reported that she has had aneurysms in the past that have affected her memory. She is grossly min assist supine to sit and transferred to [facility]. She toileted with min assist for clothing management in standing. She is unsteady on her feet and she does wear a brace on Her  R ankle from previous fracture. She was min assist with tranfers. She would benefit from skilled OT services. She is sitting up in recliner looking at her menu.  Tab alert in place and nursing advised on patient status. Will follow. This section established at most recent assessment   PROBLEM LIST (Impairments causing functional limitations):  Decreased Strength  Decreased ADL/Functional Activities  Decreased Transfer Abilities  Decreased Ambulation Ability/Technique  Decreased Balance   INTERVENTIONS PLANNED: (Benefits and precautions of occupational therapy have been discussed with the patient.)  Activities of daily living training  Adaptive equipment training  Balance training  Clothing management  Cognitive training  Donning&doffing training  Hang tech training  Neuromuscular re-eduation  Therapeutic activity  Therapeutic exercise     TREATMENT PLAN: Frequency/Duration: Follow patient 3 times to address above goals. Rehabilitation Potential For Stated Goals: Good     REHAB RECOMMENDATIONS (at time of discharge pending progress):    Placement: It is my opinion, based on this patient's performance to date, that Ms. Alysia Toro may benefit from [address] after discharge due to the functional deficits listed above that are likely to improve with skilled rehabilitation because he/she has multiple medical issues that affect his/her functional mobility in the community. Equipment:   None at this time              OCCUPATIONAL PROFILE AND HISTORY:   History of Present Injury/Illness (Reason for Referral):  See H and P  Past Medical History/Comorbidities:   Ms. Alysia Toro  has a past medical history of Anxiety, Cancer ([address] Depression, Diabetes ([address] Heart failure ([address] Hypertension, Parkinson's disease ([address] Peripheral neuropathy, Polyarthritis, and Sjogren's disease ([address] She also has no past medical history of Nicotine vapor product user or Non-nicotine vapor product user. Ms. Alysia Toro  has a past surgical history that includes hx appendectomy; hx cholecystectomy; and hx total vaginal hysterectomy.   Social History/Living Environment:   Home Environment: Trailer/mobile home  # Steps to Enter: 3  Rails to Enter: Yes  Hand Rails : Right  One/Two Story Residence: One story  Living Alone: No  Support Systems:  (sister lives with her)  Patient Expects to be Discharged to[de-identified] Trailer/mobile home  Current DME Used/Available at Home: Shower chair, Walker, rollator  Tub or Shower Type: Shower  Prior Level of Function/Work/Activity:  Mod I used rollator     Number of Personal Factors/Comorbidities that affect the Plan of Care: Brief history (0):  LOW COMPLEXITY   ASSESSMENT OF OCCUPATIONAL PERFORMANCE[de-identified]   Activities of Daily Living:   Basic ADLs (From Assessment) Complex ADLs (From Assessment)   Feeding: Supervision  Oral Facial Hygiene/Grooming: Supervision  Bathing: Minimum assistance  Upper Body Dressing: Supervision  Lower Body Dressing: Minimum assistance, Moderate assistance  Toileting: Minimum assistance (clothing management in standing)     Grooming/Bathing/Dressing Activities of Daily Living     Cognitive Retraining  Safety/Judgement: Awareness of environment; Fall prevention           Toileting  Toileting Assistance: Minimum assistance  Bladder Hygiene: Stand-by assistance  Clothing Management: Minimum assistance  Adaptive Equipment:  (BSC, walker)     Functional Transfers  Toilet Transfer : Minimum assistance     Bed/Mat Mobility  Supine to Sit: Minimum assistance  Sit to Stand: Minimum assistance  Stand to Sit: Minimum assistance  Bed to Chair: Minimum assistance  Scooting: Minimum assistance     Most Recent Physical Functioning:   Gross Assessment:                  Posture:     Balance:  Sitting: Intact  Standing: Impaired;Pull to stand; With support Bed Mobility:  Supine to Sit: Minimum assistance  Scooting: Minimum assistance  Wheelchair Mobility:     Transfers:  Sit to Stand: Minimum assistance  Stand to Sit: Minimum assistance  Bed to Chair: Minimum assistance            Patient Vitals for the past 6 hrs:   BP BP Patient Position SpO2 Pulse   09/22/21 0444 (!) 151/51 Sitting 98 % 66   21 0740 122/69 At rest 96 % 60       Mental Status  Neurologic State: Drowsy  Orientation Level: Disoriented to time, Oriented to person  Cognition: Follows commands, Memory loss (reported she has had memory loss due to [employer])  Perception: Appears intact  Perseveration: No perseveration noted  Safety/Judgement: Awareness of environment, Fall prevention                          Physical Skills Involved:  Balance  Strength  Activity Tolerance Cognitive Skills Affected (resulting in the inability to perform in a timely and safe manner): Long Term Memory Psychosocial Skills Affected:  Self-Awareness   Number of elements that affect the Plan of Care: 1-3:  LOW COMPLEXITY   CLINICAL DECISION MAKIN[address] [place] [id number] AM-PAC 6 Clicks   Daily Activity Inpatient Short Form  How much help from another person does the patient currently need. .. Total A Lot A Little None   1. Putting on and taking off regular lower body clothing? [] 1   [x] 2   [] 3   [] 4   2. Bathing (including washing, rinsing, drying)? [] 1   [] 2   [x] 3   [] 4   3. Toileting, which includes using toilet, bedpan or urinal?   [] 1   [] 2   [x] 3   [] 4   4. Putting on and taking off regular upper body clothing? [] 1   [] 2   [] 3   [x] 4   5. Taking care of personal grooming such as brushing teeth? [] 1   [] 2   [] 3   [x] 4   6. Eating meals? [] 1   [] 2   [] 3   [x] 4   © , Trustees of [address] [place] [id number], under license to [employer]. All rights reserved      Score:  Initial: 20 Most Recent: X (Date: -- )    Interpretation of Tool:  Represents activities that are increasingly more difficult (i.e. Bed mobility, Transfers, Gait).      Medical Necessity:     · Patient is expected to demonstrate progress in   · Self care skills and functional mobility  ·     Reason for Services/Other Comments:  · Patient continues to require skilled intervention due to   · Above listed deficits     Use of outcome tool(s) and clinical judgement create a POC that gives a: LOW COMPLEXITY         TREATMENT:   (In addition to Assessment/Re-Assessment sessions the following treatments were rendered)     Pre-treatment Symptoms/Complaints:    Pain: Initial:   Pain Intensity 1: 0  Post Session:  0/10     Self Care: (10): Procedure(s) (per grid) utilized to improve and/or restore self-care/home management as related to toileting, grooming, and functional mobility . Required moderate visual, verbal, manual, and tactile cueing to facilitate activities of daily living skills and compensatory activities. Assessment/Reassessment complete    Braces/Orthotics/Lines/Etc:   O2 Device: None (Room air)  Treatment/Session Assessment:    Response to Treatment:  tolerated well, drowsy, plans to go home with sister  Interdisciplinary Collaboration:   Physical Therapist  Occupational Therapist  Registered Nurse  After treatment position/precautions:   Up in chair  Bed alarm/tab alert on  Bed/Chair-wheels locked  Bed in low position  Call light within reach  RN notified   Compliance with Program/Exercises: Will assess as treatment progresses. Recommendations/Intent for next treatment session: \"Next visit will focus on advancements to more challenging activities and reduction in assistance provided\".   Total Treatment Duration:10  OT Patient Time In/Time Out  Time In: 0900  Time Out: [id number] Ry Harris OT

## 2021-09-22 NOTE — PROGRESS NOTES
Problem: Mobility Impaired (Adult and Pediatric)  Goal: *Acute Goals and Plan of Care (Insert Text)  Outcome: Progressing Towards Goal  Note:   LTG:  (1.)Ms. Ann Boss will move from supine to sit and sit to supine  in bed with STAND BY ASSIST within 7 treatment day(s). (2.)Ms. Ann Boss will transfer from bed to chair and chair to bed with STAND BY ASSIST using the least restrictive device within 7 treatment day(s). (3.)Ms. Ann Boss will ambulate with STAND BY ASSIST for 100 feet with the least restrictive device within 7 treatment day(s). (4)Ms. Ann Boss will go up 3 steps with rails CGA in 7 days. (5)Ms. Ann Boss will perform HEP with cues and assistance to increase safety on her feet in 7 days. ________________________________________________________________________________________________      PHYSICAL THERAPY: Initial Assessment and AM 9/22/2021  OBSERVATION: PT Visit Days : 1  [place] / Product Type: Medicare /       NAME/AGE/GENDER: Antoinette Valerio is a 70 y.o. female   PRIMARY DIAGNOSIS: Chest pain [R07.9] Chest pain Chest pain        ICD-10: Treatment Diagnosis:    Generalized Muscle Weakness (M62.81)  Other abnormalities of gait and mobility (R26.89)   Precaution/Allergies:  Codeine and Prednisone      ASSESSMENT:     Ms. Ann Boss presents with decreased functional mobility and general weakness. She lives with her sister and is normally independent with a rollator. She reports some decreased balance and right ankle issues and she normally wears a brace. She seems anxious this am.  She agreed to get up. She does not have her right ankle brace. She seems confused. She did not know her age and thought it was 2016. She took steps with RW to bsc and back to chair. She is not steady on her feet and needed some assistance. Left in chair without complaint. Anticipate her going back home, her sister can assist and HHPT.   At this time, patient is appropriate for Co-treatment with physical therapy due to patient's decreased overall endurance/tolerance levels, as well as need for high level skilled assistance to complete functional transfers/mobility and functional tasks. Antoinette Valerio is appropriate for a multidisciplinary co-treatment of PT and OT to address goals of both disciplines. This section established at most recent assessment   PROBLEM LIST (Impairments causing functional limitations):  Decreased Strength  Decreased ADL/Functional Activities  Decreased Transfer Abilities  Decreased Ambulation Ability/Technique  Decreased Balance  Decreased Activity Tolerance  Increased Fatigue  Decreased [place] with Home Exercise Program  Decreased Cognition   INTERVENTIONS PLANNED: (Benefits and precautions of physical therapy have been discussed with the patient.)  Balance Exercise  Bed Mobility  Family Education  Gait Training  Home Exercise Program (HEP)  Range of Motion (ROM)  Therapeutic Activites  Therapeutic Exercise/Strengthening  Transfer Training     TREATMENT PLAN: Frequency/Duration: daily for duration of hospital stay  Rehabilitation Potential For Stated Goals: Good     REHAB RECOMMENDATIONS (at time of discharge pending progress):    Placement:  HHPT  Equipment:   None at this time              HISTORY:   History of Present Injury/Illness (Reason for Referral):  71 yo CF with past history of DM type II, HTN, HLD, PD, urinary incontinance, MDD presents with chest discomfort and shortness of breath that has been going on for less than one day. Her chest pain is in the center (patient pointed) and feels dull and does not radiate. She does not know anything that makes it worse. No cough. No sick contacts including patients with COVID. No fevers/chills, abdominal pain, nausea/vomiting, or diarrhea. Per chart review she recently completed a course of Macrobid for uncomplicated cystitis. ED course: EKG shows NSR with LBBB.  Troponin of 184 with repeat of 163. K of 2.9. D dimer is negative. RPP is negative including COVID. Procalcitonin is negative. Given several doses of potassium. Discussed with cardiology who recommends hospitalist admission and will see in the morning. Past Medical History/Comorbidities:   Ms. Levar Harrison  has a past medical history of Anxiety, Cancer ([address] Depression, Diabetes ([address] Heart failure ([address] Hypertension, Parkinson's disease ([address] Peripheral neuropathy, Polyarthritis, and Sjogren's disease ([address] She also has no past medical history of Nicotine vapor product user or Non-nicotine vapor product user. Ms. Levar Harrison  has a past surgical history that includes hx appendectomy; hx cholecystectomy; and hx total vaginal hysterectomy. Social History/Living Environment:   Home Environment: Trailer/mobile home  # Steps to Enter: 3  Rails to Enter: Yes  [employer] : Right  One/Two Story Residence: One story  Living Alone: No  Support Systems:  (sister lives with her)  Patient Expects to be Discharged to[de-identified] Trailer/mobile home  Current DME Used/Available at Home: Shower chair, Walker, rollator  Tub or Shower Type: Shower  Prior Level of Function/Work/Activity:  Independent, rollator     Number of Personal Factors/Comorbidities that affect the Plan of Care: 1-2: MODERATE COMPLEXITY   EXAMINATION:   Most Recent Physical Functioning:   Gross Assessment:  AROM: Generally decreased, functional (LE's, right ankle limited)  Strength: Generally decreased, functional (LE's, right ankle limited)               Posture:     Balance:  Sitting: Intact  Standing: Impaired;Pull to stand; With support Bed Mobility:  Supine to Sit: Minimum assistance  Wheelchair Mobility:     Transfers:  Sit to Stand: Minimum assistance  Stand to Sit: Minimum assistance  Bed to Chair: Minimum assistance  Duration: 15 Minutes  Gait:     Speed/Orly: Delayed  Step Length: Left shortened;Right shortened  Stance: Right decreased  Gait Abnormalities: Antalgic  Distance (ft): 4 Feet (ft) (x 2 on and off bedside commode and to chair)  Assistive Device: Walker, rolling  Ambulation - Level of Assistance: Minimal assistance  Interventions: Safety awareness training;Verbal cues      Body Structures Involved:  Bones  Joints  Muscles  Ligaments Body Functions Affected: Movement Related Activities and Participation Affected: Mobility   Number of elements that affect the Plan of Care: 3: MODERATE COMPLEXITY   CLINICAL PRESENTATION:   Presentation: Stable and uncomplicated: LOW COMPLEXITY   CLINICAL DECISION MAKING:   [facility] [facility]ks   Basic Mobility Inpatient Short Form  How much difficulty does the patient currently have. .. Unable A Lot A Little None   1. Turning over in bed (including adjusting bedclothes, sheets and blankets)? [] 1   [] 2   [x] 3   [] 4   2. Sitting down on and standing up from a chair with arms ( e.g., wheelchair, bedside commode, etc.)   [] 1   [] 2   [x] 3   [] 4   3. Moving from lying on back to sitting on the side of the bed? [] 1   [] 2   [x] 3   [] 4   How much help from another person does the patient currently need. .. Total A Lot A Little None   4. Moving to and from a bed to a chair (including a wheelchair)? [] 1   [] 2   [x] 3   [] 4   5. Need to walk in hospital room? [] 1   [] 2   [x] 3   [] 4   6. Climbing 3-5 steps with a railing? [] 1   [x] 2   [] 3   [] 4   © 2007, Trustees of [facility] [employer], under license to [employer] All rights reserved      Score:  Initial: 17 Most Recent: X (Date: -- )    Interpretation of Tool:  Represents activities that are increasingly more difficult (i.e. Bed mobility, Transfers, Gait). Medical Necessity:     Patient is expected to demonstrate progress in   strength, range of motion, and balance   to   decrease assistance required with exercises and functional mobility  .   Reason for Services/Other Comments:  Patient   continues to require present interventions due to patient's inability to perform exercises and functional mobility independently  . Use of outcome tool(s) and clinical judgement create a POC that gives a: Questionable prediction of patient's progress: MODERATE COMPLEXITY            TREATMENT:   (In addition to Assessment/Re-Assessment sessions the following treatments were rendered)   Pre-treatment Symptoms/Complaints:  anxious  Pain: Initial:      Post Session:  right ankle   assessment  Therapeutic Activity: (  15 Minutes ):  Therapeutic activities including Bed transfers, Chair transfers, Toilet transfers, Ambulation on level ground, and standing with RW to improve mobility, strength, and balance. Required minimal Safety awareness training;Verbal cues to promote static and dynamic balance in standing. Braces/Orthotics/Lines/Etc:   O2 Device: None (Room air)  Treatment/Session Assessment:    Response to Treatment:  did ok  Interdisciplinary Collaboration:   Occupational Therapist  Registered Nurse  After treatment position/precautions:   Up in chair  Bed alarm/tab alert on  Bed/Chair-wheels locked  Bed in low position  Call light within reach   Compliance with Program/Exercises: Will assess as treatment progresses  Recommendations/Intent for next treatment session: \"Next visit will focus on advancements to more challenging activities and reduction in assistance provided\".   Total Treatment Duration:  PT Patient Time In/Time Out  Time In: 0900  Time Out: [address], PT

## 2021-09-22 NOTE — PROGRESS NOTES
TRANSFER - IN REPORT:    Verbal report received from [facility] on Cassie American  being received from ED for routine progression of care      Report consisted of patients Situation, Background, Assessment and   Recommendations(SBAR). Information from the following report(s) SBAR, ED Summary, [facility] and Recent Results was reviewed with the receiving nurse. Opportunity for questions and clarification was provided.

## 2021-09-22 NOTE — PROGRESS NOTES
Got up to recliner with therapy assisting. Was up to BR and to recliner. Pt forgetful, has difficulty remembering instructions.

## 2021-09-22 NOTE — ASSESSMENT & PLAN NOTE
Admission ECG shows LBBB, present on prior; risk factors for CAD; Trp low, fluctuating not really progressing; procalcitonin negative; RPP (and COVID) are negative; d dimer is negative  -TTE  - cardiology consult  - jet nebs as needed    9/23: recurred after discharge order placed. ECG, Trop, CXR pending. Cardiology evaluation in process.

## 2021-09-22 NOTE — PROGRESS NOTES
09/22/21 0430   Dual Skin Pressure Injury Assessment   Dual Skin Pressure Injury Assessment WDL   Second Care Provider (Based on [address] [place] [address]) Paige Whiteside, RN    Skin Integumentary   Skin Integumentary (WDL) WDL    Pressure  Injury Documentation No Pressure Injury Noted-Pressure Ulcer Prevention Initiated

## 2021-09-22 NOTE — PROGRESS NOTES
Hospitalist Progress Note   Admit Date:  2021  5:49 PM   Name:  Drea Smith   Age:  70 y.o. Sex:  female  :  1950   MRN:  [id number]   Room:  [facility]/    Presenting Complaint: Shortness of Breath and Anxiety    Reason(s) for Admission: Chest pain [R07.9]     Hospital Course & Interval History:   71F PMHx DM type II, HTN, HLD, PD, urinary incontinance, MDD presented  with chest discomfort and shortness of breath that had been going on for less than one day. Her chest pain was in the center (patient pointed) and felt dull and did not radiate. She does not know anything that makes it worse. No cough. No sick contacts including patients with COVID. No fevers/chills, abdominal pain, nausea/vomiting, or diarrhea. Per chart review she recently completed a course of Macrobid for uncomplicated cystitis.      ED course: EKG showed NSR with LBBB. Troponin of 184 with repeat of 163. K of 2.9. D dimer is negative. RPP is negative including COVID. Procalcitonin is negative. Given several doses of potassium. Discussed with cardiology who recommends hospitalist admission and will see in the morning. Subjective (21):  Pain controlled today. No new complaints. Assessment & Plan:   * Chest pain  Admission ECG shows LBBB, present on prior; risk factors for CAD;  Trp low, fluctuating not really progressing; procalcitonin negative; RPP (and COVID) are negative; d dimer is negative  -TTE  - cardiology consult  - jet nebs as needed    HTN (hypertension)  -amlodipine 10, metoprolol 50    HLD (hyperlipidemia)  -atorvastatin 40    Parkinson disease (HCC)  -sinemet, methocarbamol, gabapentin           Dispo/Discharge Planning:      Home 2-3d    Diet:  ADULT DIET Regular  DVT PPx: SCDs  Code status: Full Code    Hospital Problems as of 2021 Never Reviewed        Codes Class Noted - Resolved POA    * (Principal) Chest pain ICD-10-CM: R07.9  ICD-9-CM: 786.50  2021 - Present Yes        HTN (hypertension) ICD-10-CM: I10  ICD-9-CM: 401.9  9/22/2021 - Present Yes        HLD (hyperlipidemia) ICD-10-CM: E78.5  ICD-9-CM: 272.4  9/22/2021 - Present Yes        Parkinson disease ([facility] ICD-10-CM: Beverly Moat  ICD-9-CM: 332.0  9/22/2021 - Present Yes        Demand ischemia ([facility] ICD-10-CM: I24.8  ICD-9-CM: 411.89  9/22/2021 - Present Yes        DM (diabetes mellitus) ([facility] ICD-10-CM: E11.9  ICD-9-CM: 250.00  9/22/2021 - Present Yes        Urinary incontinence ICD-10-CM: R32  ICD-9-CM: 788.30  9/22/2021 - Present Yes              Objective:     Patient Vitals for the past 24 hrs:   Temp Pulse Resp BP SpO2   09/22/21 1208 98 °F (36.7 °C) 60 16 (!) 118/57 95 %   09/22/21 0740 97.6 °F (36.4 °C) 60 16 122/69 96 %   09/22/21 0444 97.7 °F (36.5 °C) 66 16 (!) 151/51 98 %   09/22/21 0045 -- 71 -- 132/60 96 %   09/21/21 1900 -- 66 11 (!) 117/56 96 %   09/21/21 1844 -- 66 12 -- 96 %   09/21/21 1830 -- 66 24 136/64 97 %   09/21/21 1800 -- 68 17 (!) 146/65 99 %   09/21/21 1758 97.5 °F (36.4 °C) 72 14 (!) 147/65 98 %     Oxygen Therapy  O2 Sat (%): 95 % (09/22/21 1208)  Pulse via Oximetry: 66 beats per minute (09/21/21 1900)  O2 Device: None (Room air) (09/21/21 1758)    Estimated body mass index is 26.57 kg/m² as calculated from the following:    Height as of this encounter: 5' 3\" (1.6 m). Weight as of this encounter: 68 kg (150 lb). No intake or output data in the 24 hours ending 09/22/21 1440      Physical Exam  Vitals and nursing note reviewed. Constitutional:       General: She is not in acute distress. Appearance: Normal appearance. HENT:      Head: Normocephalic. Eyes:      Extraocular Movements: Extraocular movements intact. Cardiovascular:      Rate and Rhythm: Normal rate. Pulses:           Radial pulses are 2+ on the left side. Pulmonary:      Effort: Pulmonary effort is normal. No respiratory distress. Musculoskeletal:         General: No deformity. Cervical back: No rigidity.    Skin: General: Skin is warm and dry. Neurological:      General: No focal deficit present. Mental Status: She is alert and oriented to person, place, and time. Psychiatric:         Mood and Affect: Mood normal.         Behavior: Behavior normal.         I have reviewed ordered lab tests and independently visualized imaging below:    Last 24hr Labs:  Recent Results (from the past 24 hour(s))   EKG, 12 LEAD, INITIAL    Collection Time: 09/21/21  5:57 PM   Result Value Ref Range    Ventricular Rate 68 BPM    Atrial Rate 68 BPM    P-R Interval 148 ms    QRS Duration 132 ms    Q-T Interval 462 ms    QTC Calculation (Bezet) 491 ms    Calculated P Axis 36 degrees    Calculated R Axis -37 degrees    Calculated T Axis 119 degrees    Diagnosis       Normal sinus rhythm  Left axis deviation  Left bundle branch block  Abnormal ECG  No previous ECGs available  Confirmed by Debra Conde ([id number]) on 9/22/2021 7:37:59 AM     CBC WITH AUTOMATED DIFF    Collection Time: 09/21/21  6:13 PM   Result Value Ref Range    WBC 10.7 4.3 - 11.1 K/uL    RBC 4.65 4.05 - 5.2 M/uL    HGB 14.3 11.7 - 15.4 g/dL    HCT 42.5 35.8 - 46.3 %    MCV 91.4 79.6 - 97.8 FL    MCH 30.8 26.1 - 32.9 PG    MCHC 33.6 31.4 - 35.0 g/dL    RDW 13.3 11.9 - 14.6 %    PLATELET 647 415 - 565 K/uL    MPV 10.3 9.4 - 12.3 FL    ABSOLUTE NRBC 0.00 0.0 - 0.2 K/uL    DF AUTOMATED      NEUTROPHILS 66 43 - 78 %    LYMPHOCYTES 26 13 - 44 %    MONOCYTES 6 4.0 - 12.0 %    EOSINOPHILS 1 0.5 - 7.8 %    BASOPHILS 0 0.0 - 2.0 %    IMMATURE GRANULOCYTES 0 0.0 - 5.0 %    ABS. NEUTROPHILS 7.1 1.7 - 8.2 K/UL    ABS. LYMPHOCYTES 2.8 0.5 - 4.6 K/UL    ABS. MONOCYTES 0.7 0.1 - 1.3 K/UL    ABS. EOSINOPHILS 0.1 0.0 - 0.8 K/UL    ABS. BASOPHILS 0.0 0.0 - 0.2 K/UL    ABS. IMM.  GRANS. 0.0 0.0 - 0.5 K/UL   METABOLIC PANEL, COMPREHENSIVE    Collection Time: 09/21/21  6:13 PM   Result Value Ref Range    Sodium 140 136 - 145 mmol/L    Potassium 2.9 (L) 3.5 - 5.1 mmol/L    Chloride 103 98 - 107 mmol/L    CO2 26 21 - 32 mmol/L    Anion gap 11 7 - 16 mmol/L    Glucose 134 (H) 65 - 100 mg/dL    BUN 19 8 - 23 MG/DL    Creatinine 0.91 0.6 - 1.0 MG/DL    GFR est AA >60 >60 ml/min/1.73m2    GFR est non-AA >60 >60 ml/min/1.73m2    Calcium 9.0 8.3 - 10.4 MG/DL    Bilirubin, total 0.3 0.2 - 1.1 MG/DL    ALT (SGPT) 23 12 - 65 U/L    AST (SGOT) 19 15 - 37 U/L    Alk.  phosphatase 73 50 - 130 U/L    Protein, total 7.4 6.3 - 8.2 g/dL    Albumin 4.1 3.2 - 4.6 g/dL    Globulin 3.3 2.3 - 3.5 g/dL    A-G Ratio 1.2 1.2 - 3.5     MAGNESIUM    Collection Time: 09/21/21  6:13 PM   Result Value Ref Range    Magnesium 2.2 1.8 - 2.4 mg/dL   TROPONIN-HIGH SENSITIVITY    Collection Time: 09/21/21  6:13 PM   Result Value Ref Range    Troponin-High Sensitivity 184.6 (HH) 0 - 14 pg/mL   D DIMER    Collection Time: 09/21/21  6:13 PM   Result Value Ref Range    D DIMER <0.27 <0.56 ug/ml(FEU)   PROCALCITONIN    Collection Time: 09/21/21  6:13 PM   Result Value Ref Range    Procalcitonin <0.05 ng/mL   COVID-19 RAPID TEST    Collection Time: 09/21/21  8:51 PM   Result Value Ref Range    Specimen source NASAL SWAB      COVID-19 rapid test Not detected NOTD     TROPONIN-HIGH SENSITIVITY    Collection Time: 09/21/21  8:51 PM   Result Value Ref Range    Troponin-High Sensitivity 163.9 (HH) 0 - 14 pg/mL   RESPIRATORY VIRUS PANEL W/COVID-19, PCR    Collection Time: 09/22/21 12:46 AM    Specimen: Nasopharyngeal   Result Value Ref Range    Adenovirus NOT DETECTED NOTDET      Coronavirus 229E NOT DETECTED NOTDET      Coronavirus HKU1 NOT DETECTED NOTDET      Coronavirus CVNL63 NOT DETECTED NOTDET      Coronavirus OC43 NOT DETECTED NOTDET      SARS-CoV-2, PCR NOT DETECTED NOTDET      Metapneumovirus NOT DETECTED NOTDET      Rhinovirus and Enterovirus NOT DETECTED NOTDET      Influenza A NOT DETECTED NOTDET      Influenza B NOT DETECTED NOTDET      Parainfluenza 1 NOT DETECTED NOTDET      Parainfluenza 2 NOT DETECTED NOTDET      Parainfluenza 3 NOT DETECTED NOTDET      Parainfluenza virus 4 NOT DETECTED NOTDET      RSV by PCR NOT DETECTED NOTDET      B. parapertussis, PCR NOT DETECTED NOTDET      Bordetella pertussis - PCR NOT DETECTED NOTDET      Chlamydophila pneumoniae DNA, QL, PCR NOT DETECTED NOTDET      Mycoplasma pneumoniae DNA, QL, PCR NOT DETECTED NOTDET     TROPONIN-HIGH SENSITIVITY    Collection Time: 09/22/21  4:16 AM   Result Value Ref Range    Troponin-High Sensitivity 199.7 (HH) 0 - 14 pg/mL   METABOLIC PANEL, BASIC    Collection Time: 09/22/21  4:16 AM   Result Value Ref Range    Sodium 141 136 - 145 mmol/L    Potassium 4.1 3.5 - 5.1 mmol/L    Chloride 108 (H) 98 - 107 mmol/L    CO2 27 21 - 32 mmol/L    Anion gap 6 (L) 7 - 16 mmol/L    Glucose 113 (H) 65 - 100 mg/dL    BUN 15 8 - 23 MG/DL    Creatinine 0.69 0.6 - 1.0 MG/DL    GFR est AA >60 >60 ml/min/1.73m2    GFR est non-AA >60 >60 ml/min/1.73m2    Calcium 8.6 8.3 - 10.4 MG/DL   ECHO ADULT COMPLETE    Collection Time: 09/22/21  4:29 AM   Result Value Ref Range    Left Atrium Minor Axis 5.10 cm    Left Atrium Major Axis 5.90 cm    LA Area 2C 19.00 cm2    LA Area 4C 20.90 cm2    LV ED Vol A4C 84.00 cm3    LV ES Vol A4C 33.00 cm3    IVSd 1.04 (A) 0.60 - 0.90 cm    LVIDd 4.21 3.90 - 5.30 cm    LVIDs 3.15 cm    LVOT d 1.80 cm    LVOT VTI 18.00 cm    LVOT Peak Gradient 3.00 mmHg    LVPWd 1.05 (A) 0.60 - 0.90 cm    LV E' Lateral Velocity 8.19 cm/s    LV E' Septal Velocity 5.65 cm/s    Aortic Valve Systolic Mean Gradient 8.29 mmHg    AoV VTI 28.50 cm    Aortic Valve Systolic Peak Velocity 521.20 cm/s    AoV PG 7.00 mmHg    Aortic Valve Area by Continuity of VTI 1.60 cm2    Aortic Valve Area by Continuity of Peak Velocity 1.65 cm2    Ao Root D 2.80 cm    AO ASC D 2.80 cm    Mitral Valve E Wave Deceleration Time 169.00 ms    MV A Elias 59.70 cm/s    MV E Elias 104.00 cm/s    RVIDd 2.92 cm    TR Max Velocity 230.00 cm/s    Triscuspid Valve Regurgitation Peak Gradient 21.00 mmHg    Tapse 1.70 1.50 - 2.00 cm All Micro Results     Procedure Component Value Units Date/Time    RESPIRATORY VIRUS PANEL W/COVID-19, PCR [[id number]] Collected: 09/22/21 0046    Order Status: Completed Specimen: Nasopharyngeal Updated: 09/22/21 0346     Adenovirus NOT DETECTED        Coronavirus 229E NOT DETECTED        Coronavirus HKU1 NOT DETECTED        Coronavirus CVNL63 NOT DETECTED        Coronavirus OC43 NOT DETECTED        SARS-CoV-2, PCR NOT DETECTED        Metapneumovirus NOT DETECTED        Rhinovirus and Enterovirus NOT DETECTED        Influenza A NOT DETECTED        Influenza B NOT DETECTED        Parainfluenza 1 NOT DETECTED        Parainfluenza 2 NOT DETECTED        Parainfluenza 3 NOT DETECTED        Parainfluenza virus 4 NOT DETECTED        RSV by PCR NOT DETECTED        B. parapertussis, PCR NOT DETECTED        Bordetella pertussis - PCR NOT DETECTED        Chlamydophila pneumoniae DNA, QL, PCR NOT DETECTED        Mycoplasma pneumoniae DNA, QL, PCR NOT DETECTED       COVID-19 RAPID TEST [[id number]] Collected: 09/21/21 2051    Order Status: Completed Specimen: Nasopharyngeal Updated: 09/21/21 2122     Specimen source NASAL SWAB        COVID-19 rapid test Not detected        Comment:      The specimen is NEGATIVE for SARS-CoV-2, the novel coronavirus associated with COVID-19. A negative result does not rule out COVID-19. This test has been authorized by the FDA under an Emergency Use Authorization (EUA) for use by authorized laboratories. Fact sheet for Healthcare Providers: ConventionUpdate.co.nz  Fact sheet for Patients: ConventionUpdate.co.nz       Methodology: Isothermal Nucleic Acid Amplification               Other Studies:  XR CHEST PORT    Result Date: 9/21/2021  Title: Chest Radiographs Indication:  Shortness of breath. Comparison: Chest Radiographs dated July 15, 2004. Findings:  Lungs/pleura:No evidence of consolidation or effusion.  Small granulomas Vascularity:Normal Vasculature Cardiac:Normal size cardiac silhouette Mediastinum:Aortic calcifications Bones:No obvious acute fracture     No acute process        Current Meds:  Current Facility-Administered Medications   Medication Dose Route Frequency    sodium chloride (NS) flush 5-40 mL  5-40 mL IntraVENous Q8H    sodium chloride (NS) flush 5-40 mL  5-40 mL IntraVENous PRN    acetaminophen (TYLENOL) tablet 650 mg  650 mg Oral Q6H PRN    Or    acetaminophen (TYLENOL) suppository 650 mg  650 mg Rectal Q6H PRN    polyethylene glycol (MIRALAX) packet 17 g  17 g Oral DAILY PRN    ondansetron (ZOFRAN ODT) tablet 4 mg  4 mg Oral Q8H PRN    Or    ondansetron (ZOFRAN) injection 4 mg  4 mg IntraVENous Q6H PRN    enoxaparin (LOVENOX) injection 40 mg  40 mg SubCUTAneous DAILY    carbidopa-levodopa (SINEMET)  mg per tablet 0.5 Tablet  0.5 Tablet Oral TID    atorvastatin (LIPITOR) tablet 40 mg  40 mg Oral QHS    amLODIPine (NORVASC) tablet 10 mg  10 mg Oral DAILY    albuterol (PROVENTIL VENTOLIN) nebulizer solution 1.25 mg  1.25 mg Nebulization Q6H PRN    gabapentin (NEURONTIN) capsule 300 mg  300 mg Oral BID    metoprolol succinate (TOPROL-XL) XL tablet 50 mg  50 mg Oral DAILY    sodium chloride (NS) flush 5-10 mL  5-10 mL IntraVENous Q8H    sodium chloride (NS) flush 5-10 mL  5-10 mL IntraVENous PRN       Signed:  Nicholas Hair MD

## 2021-09-23 ENCOUNTER — APPOINTMENT (OUTPATIENT)
Dept: GENERAL RADIOLOGY | Age: 71
DRG: 311 | End: 2021-09-23
Attending: FAMILY MEDICINE
Payer: MEDICARE

## 2021-09-23 PROBLEM — R32 URINARY INCONTINENCE: Status: RESOLVED | Noted: 2021-09-22 | Resolved: 2021-09-23

## 2021-09-23 PROBLEM — I24.8 DEMAND ISCHEMIA (HCC): Status: RESOLVED | Noted: 2021-09-22 | Resolved: 2021-09-23

## 2021-09-23 PROBLEM — R07.9 CHEST PAIN: Status: RESOLVED | Noted: 2021-09-22 | Resolved: 2021-09-23

## 2021-09-23 LAB
ANION GAP SERPL CALC-SCNC: 6 MMOL/L (ref 7–16)
BASOPHILS # BLD: 0 K/UL (ref 0–0.2)
BASOPHILS NFR BLD: 0 % (ref 0–2)
BUN SERPL-MCNC: 20 MG/DL (ref 8–23)
CALCIUM SERPL-MCNC: 8.8 MG/DL (ref 8.3–10.4)
CHLORIDE SERPL-SCNC: 105 MMOL/L (ref 98–107)
CO2 SERPL-SCNC: 28 MMOL/L (ref 21–32)
CREAT SERPL-MCNC: 0.84 MG/DL (ref 0.6–1)
DIFFERENTIAL METHOD BLD: NORMAL
EOSINOPHIL # BLD: 0.2 K/UL (ref 0–0.8)
EOSINOPHIL NFR BLD: 2 % (ref 0.5–7.8)
ERYTHROCYTE [DISTWIDTH] IN BLOOD BY AUTOMATED COUNT: 13.2 % (ref 11.9–14.6)
GLUCOSE SERPL-MCNC: 181 MG/DL (ref 65–100)
HCT VFR BLD AUTO: 38.1 % (ref 35.8–46.3)
HGB BLD-MCNC: 12.6 G/DL (ref 11.7–15.4)
IMM GRANULOCYTES # BLD AUTO: 0 K/UL (ref 0–0.5)
IMM GRANULOCYTES NFR BLD AUTO: 0 % (ref 0–5)
LYMPHOCYTES # BLD: 3.5 K/UL (ref 0.5–4.6)
LYMPHOCYTES NFR BLD: 37 % (ref 13–44)
MCH RBC QN AUTO: 31 PG (ref 26.1–32.9)
MCHC RBC AUTO-ENTMCNC: 33.1 G/DL (ref 31.4–35)
MCV RBC AUTO: 93.6 FL (ref 79.6–97.8)
MONOCYTES # BLD: 0.6 K/UL (ref 0.1–1.3)
MONOCYTES NFR BLD: 6 % (ref 4–12)
NEUTS SEG # BLD: 5.4 K/UL (ref 1.7–8.2)
NEUTS SEG NFR BLD: 55 % (ref 43–78)
NRBC # BLD: 0 K/UL (ref 0–0.2)
PLATELET # BLD AUTO: 195 K/UL (ref 150–450)
PMV BLD AUTO: 10.8 FL (ref 9.4–12.3)
POTASSIUM SERPL-SCNC: 3.1 MMOL/L (ref 3.5–5.1)
RBC # BLD AUTO: 4.07 M/UL (ref 4.05–5.2)
SODIUM SERPL-SCNC: 139 MMOL/L (ref 136–145)
TROPONIN-HIGH SENSITIVITY: 164.2 PG/ML (ref 0–14)
WBC # BLD AUTO: 9.7 K/UL (ref 4.3–11.1)

## 2021-09-23 PROCEDURE — 96372 THER/PROPH/DIAG INJ SC/IM: CPT

## 2021-09-23 PROCEDURE — 99222 1ST HOSP IP/OBS MODERATE 55: CPT | Performed by: INTERNAL MEDICINE

## 2021-09-23 PROCEDURE — 74011250636 HC RX REV CODE- 250/636: Performed by: INTERNAL MEDICINE

## 2021-09-23 PROCEDURE — 71045 X-RAY EXAM CHEST 1 VIEW: CPT

## 2021-09-23 PROCEDURE — 99218 HC RM OBSERVATION: CPT

## 2021-09-23 PROCEDURE — 97530 THERAPEUTIC ACTIVITIES: CPT

## 2021-09-23 PROCEDURE — 36415 COLL VENOUS BLD VENIPUNCTURE: CPT

## 2021-09-23 PROCEDURE — 85025 COMPLETE CBC W/AUTO DIFF WBC: CPT

## 2021-09-23 PROCEDURE — 80048 BASIC METABOLIC PNL TOTAL CA: CPT

## 2021-09-23 PROCEDURE — 74011250637 HC RX REV CODE- 250/637: Performed by: INTERNAL MEDICINE

## 2021-09-23 PROCEDURE — 84484 ASSAY OF TROPONIN QUANT: CPT

## 2021-09-23 RX ADMIN — CARBIDOPA AND LEVODOPA 0.5 TABLET: 25; 100 TABLET ORAL at 22:07

## 2021-09-23 RX ADMIN — ENOXAPARIN SODIUM 40 MG: 40 INJECTION SUBCUTANEOUS at 08:35

## 2021-09-23 RX ADMIN — GABAPENTIN 300 MG: 300 CAPSULE ORAL at 22:07

## 2021-09-23 RX ADMIN — AMLODIPINE BESYLATE 10 MG: 10 TABLET ORAL at 08:34

## 2021-09-23 RX ADMIN — CARBIDOPA AND LEVODOPA 0.5 TABLET: 25; 100 TABLET ORAL at 08:35

## 2021-09-23 RX ADMIN — CARBIDOPA AND LEVODOPA 0.5 TABLET: 25; 100 TABLET ORAL at 18:14

## 2021-09-23 RX ADMIN — Medication 10 ML: at 05:25

## 2021-09-23 RX ADMIN — GABAPENTIN 300 MG: 300 CAPSULE ORAL at 08:34

## 2021-09-23 RX ADMIN — ATORVASTATIN CALCIUM 40 MG: 40 TABLET, FILM COATED ORAL at 22:07

## 2021-09-23 RX ADMIN — METOPROLOL SUCCINATE 50 MG: 50 TABLET, EXTENDED RELEASE ORAL at 08:35

## 2021-09-23 RX ADMIN — Medication 10 ML: at 22:08

## 2021-09-23 NOTE — DISCHARGE SUMMARY
Hospitalist Discharge Summary   Admit Date:  2021  5:49 PM   DC Note date: 2021  Name:  Sophie Garcia   Age:  70 y.o. Sex:  female  :  1950   MRN:  [id number]   Room:  [facility]  PCP:  Abilio Ward MD    Presenting Complaint: Shortness of Breath and Anxiety    Initial Admission Diagnosis: Chest pain [R07.9]  Troponin level elevated [R77.8]     Problem List for this Hospitalization:  Hospital Problems as of 2021 Date Reviewed: 2021        Codes Class Noted - Resolved POA    * (Principal) RESOLVED: Chest pain ICD-10-CM: R07.9  ICD-9-CM: 786.50  2021 - 2021 Yes        HTN (hypertension) ICD-10-CM: I10  ICD-9-CM: 401.9  2021 - Present Yes        HLD (hyperlipidemia) ICD-10-CM: E78.5  ICD-9-CM: 272.4  2021 - Present Yes        Parkinson disease ([facility] ICD-10-CM: Nimisha Art  ICD-9-CM: 332.0  2021 - Present Yes        Troponin level elevated ICD-10-CM: R77.8  ICD-9-CM: 790.6  2021 - Present Yes        Stable angina pectoris ([facility] [place] (Chronic) ICD-10-CM: I20.8  ICD-9-CM: 413.9  2021 - Present Yes        DM (diabetes mellitus) ([facility] [place] ICD-10-CM: E11.9  ICD-9-CM: 250.00  2021 - Present Yes        RESOLVED: Demand ischemia ([facility] [place] ICD-10-CM: I24.8  ICD-9-CM: 411.89  2021 - 2021 Yes        RESOLVED: Urinary incontinence ICD-10-CM: R32  ICD-9-CM: 788.30  2021 - 2021 Yes            Did Patient have Sepsis (YES OR NO): No      Hospital Course:  71F PMHx DM type II, HTN, HLD, PD, urinary incontinance, MDD presented  with chest discomfort and shortness of breath that had been going on for less than one day. Her chest pain was in the center (patient pointed) and felt dull and did not radiate. She does not know anything that makes it worse. No cough. No sick contacts including patients with COVID. No fevers/chills, abdominal pain, nausea/vomiting, or diarrhea. Per chart review she recently completed a course of Macrobid for uncomplicated cystitis.    ED course: EKG showed NSR with LBBB. Troponin of 184 with repeat of 163. K of 2.9. D dimer was negative. RPP was negative including COVID. Procalcitonin was negative. Given several doses of potassium. Discussed with cardiology who recommends hospitalist admission and will see in the morning. Evaluation by cardiology determined that she would likely benefit from catheterization. She declined this intervention with a preference to follow-up with her cardiologist on outpatient basis. As such she was determined safe for discharge on 9/24 to follow-up with her cardiologist in the next 7 days. Cardiologist may consider the following:  -Patient is not an ideal historian given her history of short-term memory loss  -Experienced multiple episodes of stable angina during her brief hospitalization  -Echocardiogram 9/22 with preserved ejection fraction, diastolic dysfunction and RVSP 27 mmHg    Disposition: Home Health Care Svc  Diet: ADULT DIET Regular  Code Status: Full Code    Follow Up Orders: Follow-up Appointments   Procedures    FOLLOW UP VISIT Appointment in: One Week PCP     PCP     Standing Status:   Standing     Number of Occurrences:   1     Order Specific Question:   Appointment in     Answer: One Week    FOLLOW UP VISIT Appointment in: One Week Cardiology in 1w     Cardiology in 1w     Standing Status:   Standing     Number of Occurrences:   1     Standing Expiration Date:   9/24/2021     Order Specific Question:   Appointment in     Answer: One Week       Follow-up Information     Follow up With Specialties Details Why Contact Info    Ed, MD Abilio    Patient can only remember the practice name and not the physician          Time spent in patient discharge and coordination 44 minutes. Plan was discussed with patient, nurse, nursing supervisor. All questions answered. Patient was stable at time of discharge. Instructions given to call a physician or return if any concerns.     Discharge Info:   Current Discharge Medication List      CONTINUE these medications which have NOT CHANGED    Details   metFORMIN (GLUCOPHAGE) 500 mg tablet Take 500 mg by mouth two (2) times daily (with meals). Indications: type 2 diabetes mellitus      metoprolol succinate (Toprol XL) 50 mg XL tablet Take 50 mg by mouth daily. Indications: high blood pressure      spironolactone (ALDACTONE) 25 mg tablet Take 25 mg by mouth daily. Take 0.5 tablet daily   Indications: high blood pressure      gabapentin (NEURONTIN) 300 mg capsule Take 300 mg by mouth two (2) times a day. Take 1 capsule twice a day      oxybutynin chloride XL (DITROPAN XL) 10 mg CR tablet Take 10 mg by mouth daily. Take 1 tab daily      atorvastatin (LIPITOR) 40 mg tablet Take 40 mg by mouth nightly. amLODIPine (NORVASC) 10 mg tablet Take 10 mg by mouth daily. carbidopa-levodopa (Sinemet)  mg per tablet Take 0.5 Tablets by mouth three (3) times daily. methocarbamoL (Robaxin-750) 750 mg tablet Take 750 mg by mouth as needed for Muscle Spasm(s). 1 tablet TID for 5 days   Indications: muscle spasm      albuterol-ipratropium (DUO-NEB) 2.5 mg-0.5 mg/3 ml nebu 3 mL by Nebulization route. montelukast (Singulair) 10 mg tablet Take 10 mg by mouth nightly. ondansetron hcl (Zofran) 4 mg tablet Take 4 mg by mouth every eight (8) hours as needed for Nausea, Vomiting or Nausea or Vomiting. nitroglycerin (NITROSTAT) 0.3 mg SL tablet 0.3 mg by SubLINGual route every five (5) minutes as needed for Chest Pain. indapamide (LOZOL) 1.25 mg tablet Take 1.25 mg by mouth daily. diphenhydrAMINE (BenadryL) 25 mg capsule Take 25 mg by mouth every six (6) hours as needed for Itching or Sleep.      albuterol (ACCUNEB) 1.25 mg/3 mL nebu 1.25 mg by Nebulization route every six (6) hours as needed for Wheezing. cholecalciferol (VITAMIN D3) (1000 Units /25 mcg) tablet Take 1,000 Units by mouth daily.       diclofenac (Voltaren) 1 % gel Apply to affected area four (4) times daily. albuterol (PROVENTIL HFA, VENTOLIN HFA, PROAIR HFA) 90 mcg/actuation inhaler Take 2 Puffs by inhalation every six (6) hours as needed for Wheezing. Procedures done this admission:  * No surgery found *    Consults this admission:  IP CONSULT TO CARDIOLOGY    Echocardiogram/EKG results:  Results from Hospital Encounter encounter on 09/21/21    ECHO ADULT COMPLETE    Interpretation Summary  · Contrast used: DEFINITY. · LV: Estimated LVEF is 55 - 60%. Normal cavity size and systolic function (ejection fraction normal). Mild concentric hypertrophy. Left ventricular diastolic dysfunction. · LA: Mildly dilated left atrium. · MV: Mild mitral valve regurgitation is present. Echo results may also be under imaging section below.     EKG Results     Procedure 720 Value Units Date/Time    EKG, 12 LEAD, SUBSEQUENT [[id number]] Collected: 09/23/21 1516    Order Status: Completed Updated: 09/24/21 0918     Ventricular Rate 59 BPM      Atrial Rate 59 BPM      P-R Interval 150 ms      QRS Duration 120 ms      Q-T Interval 492 ms      QTC Calculation (Bezet) 487 ms      Calculated P Axis 58 degrees      Calculated R Axis -41 degrees      Calculated T Axis 139 degrees      Diagnosis --     !!! Poor data quality, interpretation may be adversely affected  Sinus bradycardia with sinus arrhythmia  Left axis deviation  Left bundle branch block  Abnormal ECG  When compared with ECG of 21-SEP-2021 17:57,  No significant change was found  Confirmed by ST ESME NAVARRO MD (), ESCOBAR ANNA ([id number]) on 9/24/2021 9:18:43 AM      EKG [[id number]] Collected: 09/21/21 1662    Order Status: Completed Updated: 09/22/21 0738     Ventricular Rate 68 BPM      Atrial Rate 68 BPM      P-R Interval 148 ms      QRS Duration 132 ms      Q-T Interval 462 ms      QTC Calculation (Bezet) 491 ms      Calculated P Axis 36 degrees      Calculated R Axis -37 degrees      Calculated T Axis 119 degrees      Diagnosis --     Normal sinus rhythm  Left axis deviation  Left bundle branch block  Abnormal ECG  No previous ECGs available  Confirmed by Rubbie Pod ([id number]) on 9/22/2021 7:37:59 AM            Diagnostic Imaging/Tests:   XR CHEST PORT    Result Date: 9/21/2021  Title: Chest Radiographs Indication:  Shortness of breath. Comparison: Chest Radiographs dated July 15, 2004. Findings:  Lungs/pleura:No evidence of consolidation or effusion. Small granulomas Vascularity:Normal Vasculature Cardiac:Normal size cardiac silhouette Mediastinum:Aortic calcifications Bones:No obvious acute fracture     No acute process      ECHO ADULT COMPLETE    Result Date: 9/22/2021  · Contrast used: DEFINITY. · LV: Estimated LVEF is 55 - 60%. Normal cavity size and systolic function (ejection fraction normal). Mild concentric hypertrophy. Left ventricular diastolic dysfunction. · LA: Mildly dilated left atrium. · MV: Mild mitral valve regurgitation is present.         All Micro Results     Procedure Component Value Units Date/Time    RESPIRATORY VIRUS PANEL W/COVID-19, PCR [[id number]] Collected: 09/22/21 0046    Order Status: Completed Specimen: Nasopharyngeal Updated: 09/22/21 0346     Adenovirus NOT DETECTED        Coronavirus 229E NOT DETECTED        Coronavirus HKU1 NOT DETECTED        Coronavirus CVNL63 NOT DETECTED        Coronavirus OC43 NOT DETECTED        SARS-CoV-2, PCR NOT DETECTED        Metapneumovirus NOT DETECTED        Rhinovirus and Enterovirus NOT DETECTED        Influenza A NOT DETECTED        Influenza B NOT DETECTED        Parainfluenza 1 NOT DETECTED        Parainfluenza 2 NOT DETECTED        Parainfluenza 3 NOT DETECTED        Parainfluenza virus 4 NOT DETECTED        RSV by PCR NOT DETECTED        B. parapertussis, PCR NOT DETECTED        Bordetella pertussis - PCR NOT DETECTED        Chlamydophila pneumoniae DNA, QL, PCR NOT DETECTED        Mycoplasma pneumoniae DNA, QL, PCR NOT DETECTED       COVID-19 RAPID TEST [[id number]] Collected: 09/21/21 2051    Order Status: Completed Specimen: Nasopharyngeal Updated: 09/21/21 2122     Specimen source NASAL SWAB        COVID-19 rapid test Not detected        Comment:      The specimen is NEGATIVE for SARS-CoV-2, the novel coronavirus associated with COVID-19. A negative result does not rule out COVID-19. This test has been authorized by the FDA under an Emergency Use Authorization (EUA) for use by authorized laboratories. Fact sheet for Healthcare Providers: [employer]nz  Fact sheet for Patients: [employer]       Methodology: Isothermal Nucleic Acid Amplification               Labs: Results:       BMP, Mg, Phos Recent Labs     09/24/21  0346 09/23/21  0330 09/22/21 0416 09/21/21 1813 09/21/21 1813    139 141   < > 140   K 3.6 3.1* 4.1   < > 2.9*    105 108*   < > 103   CO2 28 28 27   < > 26   AGAP 7 6* 6*   < > 11   BUN 18 20 15   < > 19   CREA 0.79 0.84 0.69   < > 0.91   CA 9.1 8.8 8.6   < > 9.0   * 181* 113*   < > 134*   MG  --   --   --   --  2.2    < > = values in this interval not displayed.       CBC Recent Labs     09/24/21 0346 09/23/21 0330 09/21/21 1813   WBC 10.5 9.7 10.7   RBC 4.30 4.07 4.65   HGB 13.1 12.6 14.3   HCT 39.1 38.1 42.5    195 217   GRANS 60 55 66   LYMPH 32 37 26   EOS 1 2 1   MONOS 7 6 6   BASOS 0 0 0   IG 0 0 0   ANEU 6.3 5.4 7.1   ABL 3.3 3.5 2.8   ORI 0.2 0.2 0.1   ABM 0.7 0.6 0.7   ABB 0.0 0.0 0.0   AIG 0.0 0.0 0.0      LFT Recent Labs     09/21/21 1813   ALT 23   AP 73   TP 7.4   ALB 4.1   GLOB 3.3   AGRAT 1.2      Cardiac Testing No results found for: BNPP, BNP, CPK, RCK1, RCK2, RCK3, RCK4, CKMB, CKNDX, CKND1, TROPT, TROIQ   Coagulation Tests No results found for: PTP, INR, APTT, INREXT, INREXT   A1c No results found for: HBA1C, FHF1CHPW, CSN4SWPH   Lipid Panel No results found for: CHOL, CHOLPOCT, CHOLX, CHLST, CHOLV, 231115, HDL, HDLP, LDL, LDLC, DLDLP, 365403, VLDLC, VLDL, TGLX, TRIGL, TRIGP, TGLPOCT, CHHD, CHHDX   Thyroid Panel No results found for: TSH, T4, FT4, TT3, T3U, TSHEXT, TSHEXT     Most Recent UA No results found for: COLOR, APPRN, REFSG, LORIE, PROTU, GLUCU, KETU, BILU, BLDU, UROU, VAL, LEUKU, WBCU, RBCU, UEPI, BACTU, CASTS, UCRY, MUCUS, UCOM       All Labs from Last 24 Hrs:  Recent Results (from the past 24 hour(s))   EKG, 12 LEAD, SUBSEQUENT    Collection Time: 09/23/21  3:16 PM   Result Value Ref Range    Ventricular Rate 59 BPM    Atrial Rate 59 BPM    P-R Interval 150 ms    QRS Duration 120 ms    Q-T Interval 492 ms    QTC Calculation (Bezet) 487 ms    Calculated P Axis 58 degrees    Calculated R Axis -41 degrees    Calculated T Axis 139 degrees    Diagnosis       !!! Poor data quality, interpretation may be adversely affected  Sinus bradycardia with sinus arrhythmia  Left axis deviation  Left bundle branch block  Abnormal ECG  When compared with ECG of 21-SEP-2021 17:57,  No significant change was found  Confirmed by ST ESME NAVARRO MD (), ESCOBAR ANNA ([id number]) on 9/24/2021 9:18:43 AM     TROPONIN-HIGH SENSITIVITY    Collection Time: 09/23/21  3:25 PM   Result Value Ref Range    Troponin-High Sensitivity 164.2 (HH) 0 - 14 pg/mL   METABOLIC PANEL, BASIC    Collection Time: 09/24/21  3:46 AM   Result Value Ref Range    Sodium 138 136 - 145 mmol/L    Potassium 3.6 3.5 - 5.1 mmol/L    Chloride 103 98 - 107 mmol/L    CO2 28 21 - 32 mmol/L    Anion gap 7 7 - 16 mmol/L    Glucose 140 (H) 65 - 100 mg/dL    BUN 18 8 - 23 MG/DL    Creatinine 0.79 0.6 - 1.0 MG/DL    GFR est AA >60 >60 ml/min/1.73m2    GFR est non-AA >60 >60 ml/min/1.73m2    Calcium 9.1 8.3 - 10.4 MG/DL   CBC WITH AUTOMATED DIFF    Collection Time: 09/24/21  3:46 AM   Result Value Ref Range    WBC 10.5 4.3 - 11.1 K/uL    RBC 4.30 4.05 - 5.2 M/uL    HGB 13.1 11.7 - 15.4 g/dL    HCT 39.1 35.8 - 46.3 %    MCV 90.9 79.6 - 97.8 FL    MCH 30.5 26.1 - 32.9 PG    MCHC 33.5 31.4 - 35.0 g/dL RDW 13.0 11.9 - 14.6 %    PLATELET 814 136 - 913 K/uL    MPV 10.8 9.4 - 12.3 FL    ABSOLUTE NRBC 0.00 0.0 - 0.2 K/uL    DF AUTOMATED      NEUTROPHILS 60 43 - 78 %    LYMPHOCYTES 32 13 - 44 %    MONOCYTES 7 4.0 - 12.0 %    EOSINOPHILS 1 0.5 - 7.8 %    BASOPHILS 0 0.0 - 2.0 %    IMMATURE GRANULOCYTES 0 0.0 - 5.0 %    ABS. NEUTROPHILS 6.3 1.7 - 8.2 K/UL    ABS. LYMPHOCYTES 3.3 0.5 - 4.6 K/UL    ABS. MONOCYTES 0.7 0.1 - 1.3 K/UL    ABS. EOSINOPHILS 0.2 0.0 - 0.8 K/UL    ABS. BASOPHILS 0.0 0.0 - 0.2 K/UL    ABS. IMM.  GRANS. 0.0 0.0 - 0.5 K/UL       Current Med List in Hospital:   Current Facility-Administered Medications   Medication Dose Route Frequency    sodium chloride (NS) flush 5-40 mL  5-40 mL IntraVENous Q8H    sodium chloride (NS) flush 5-40 mL  5-40 mL IntraVENous PRN    acetaminophen (TYLENOL) tablet 650 mg  650 mg Oral Q6H PRN    Or    acetaminophen (TYLENOL) suppository 650 mg  650 mg Rectal Q6H PRN    polyethylene glycol (MIRALAX) packet 17 g  17 g Oral DAILY PRN    ondansetron (ZOFRAN ODT) tablet 4 mg  4 mg Oral Q8H PRN    Or    ondansetron (ZOFRAN) injection 4 mg  4 mg IntraVENous Q6H PRN    enoxaparin (LOVENOX) injection 40 mg  40 mg SubCUTAneous DAILY    carbidopa-levodopa (SINEMET)  mg per tablet 0.5 Tablet  0.5 Tablet Oral TID    atorvastatin (LIPITOR) tablet 40 mg  40 mg Oral QHS    amLODIPine (NORVASC) tablet 10 mg  10 mg Oral DAILY    albuterol (PROVENTIL VENTOLIN) nebulizer solution 1.25 mg  1.25 mg Nebulization Q6H PRN    gabapentin (NEURONTIN) capsule 300 mg  300 mg Oral BID    metoprolol succinate (TOPROL-XL) XL tablet 50 mg  50 mg Oral DAILY    sodium chloride (NS) flush 5-10 mL  5-10 mL IntraVENous Q8H    sodium chloride (NS) flush 5-10 mL  5-10 mL IntraVENous PRN       Allergies   Allergen Reactions    Codeine Nausea and Vomiting    Prednisone Rash     Immunization History   Administered Date(s) Administered    COVID-19, PFIZER, MRNA, LNP-S, PF, 30MCG/0.3ML DOSE 08/04/2021, 08/26/2021       Recent Vital Data:  Patient Vitals for the past 24 hrs:   Temp Pulse Resp BP SpO2   09/24/21 0757 97.8 °F (36.6 °C) 61 18 (!) 116/58 93 %   09/24/21 0242 98.3 °F (36.8 °C) 64 16 131/65 95 %   09/23/21 2257 98.2 °F (36.8 °C) 75 18 139/61 96 %   09/23/21 1932 97.9 °F (36.6 °C) 68 20 (!) 145/69 96 %   09/23/21 1548 97.8 °F (36.6 °C) 68 20 137/67 95 %   09/23/21 1414 -- 63 22 135/67 96 %   09/23/21 1122 97.8 °F (36.6 °C) 71 20 (!) 156/64 97 %     Oxygen Therapy  O2 Sat (%): 93 % (09/24/21 0757)  Pulse via Oximetry: 66 beats per minute (09/21/21 1900)  O2 Device: None (Room air) (09/23/21 1414)    Estimated body mass index is 26.57 kg/m² as calculated from the following:    Height as of this encounter: 5' 3\" (1.6 m). Weight as of this encounter: 68 kg (150 lb). Intake/Output Summary (Last 24 hours) at 9/24/2021 1119  Last data filed at 9/23/2021 1122  Gross per 24 hour   Intake 360 ml   Output --   Net 360 ml         Physical Exam  Vitals and nursing note reviewed. Constitutional:       General: She is not in acute distress. Appearance: Normal appearance. HENT:      Head: Normocephalic. Eyes:      Extraocular Movements: Extraocular movements intact. Cardiovascular:      Rate and Rhythm: Normal rate. Pulses:           Radial pulses are 2+ on the left side. Pulmonary:      Effort: Pulmonary effort is normal. No respiratory distress. Musculoskeletal:         General: No deformity. Cervical back: No rigidity. Skin:     General: Skin is warm and dry. Neurological:      General: No focal deficit present. Mental Status: She is alert and oriented to person, place, and time. Psychiatric:         Mood and Affect: Mood normal.         Behavior: Behavior normal.         Cognition and Memory: Cognition normal. She exhibits impaired recent memory.          Judgment: Judgment normal.           Signed:  Soto Osullivan MD

## 2021-09-23 NOTE — H&P
[facility] CARDIOLOGY History &Physical                   Subjective:     Date of  Admission: 9/21/2021  5:49 PM     Primary Care Physician: Abilio Ward MD  Primary Cardiologist: None  Referring Physician: Dr Kory Bloom Physician: Dr Jami Leger     CC/Reason for evaluation: chest pain    HPI:  Chelsi May is a 70 y.o. female with PMH of HTN, dyslipidemia, DM type 2, brain aneurysm first noted in the 1980s per patient and family , and parkinson's disease who presented to [facility] ED via EMS on 9/21 with complaint of SOB and chest pain. Patient describes chest pain as dull substernal without any radiation. Has associated SOB. No alleviating or aggravating symptoms. Upon evaluation in ED, EKG showed NSR with LBBB. Labs showed WBC 10.7, H/H 14.3/42.5, Ptl 217, Na 140, K 2.9, BUN/Cr 19/0.91, Mag 2.2, procalcitonin <0.05, and hs-trop 184-163. Patient was admitted by hospitalist service. Cardiology consulted for further evaluation. Patient reports that her pain is substernal, non radiating and associated with dyspnea. Aching to dull in nature. No clear aggravating factors, but pain was moderate to severe in nature. No clear alleviating factors identified. No other associated symptoms. Denies similar pain in the past. No ongoing pain at this time. She has remained hemodynamically stable throughout admission.     Lab Results   Component Value Date     09/23/2021    K 3.1 (L) 09/23/2021    MG 2.2 09/21/2021    BUN 20 09/23/2021    CREA 0.84 09/23/2021    WBC 9.7 09/23/2021    HGB 12.6 09/23/2021     09/23/2021        Past Medical History:   Diagnosis Date    Anxiety     Cancer [facility])     melanoma    Chest pain 9/22/2021    Demand ischemia ([address] 9/22/2021    Depression     Diabetes ([facility]     Heart failure (HCC)     Hypertension     Parkinson's disease ([facility]     Peripheral neuropathy     Polyarthritis     Sjogren's disease ([facility]     Urinary incontinence 9/22/2021      Past Surgical History: Procedure Laterality Date    HX APPENDECTOMY      HX CHOLECYSTECTOMY      HX TOTAL VAGINAL HYSTERECTOMY         History reviewed. No pertinent family history. Multiple family members with HTN, CAD     Social History     Tobacco Use    Smoking status: Former Smoker    Smokeless tobacco: Never Used   Substance Use Topics    Alcohol use: Not Currently      Allergies   Allergen Reactions    Codeine Nausea and Vomiting    Prednisone Rash     Review of Systems   Constitutional: Negative. Negative for chills and fever. HENT: Negative. Eyes: Negative. Cardiovascular: Positive for chest pain. Negative for dyspnea on exertion, irregular heartbeat, leg swelling, orthopnea, palpitations, paroxysmal nocturnal dyspnea and syncope. Respiratory: Positive for shortness of breath. Skin: Negative. Musculoskeletal: Negative. Gastrointestinal: Negative. Negative for abdominal pain, nausea and vomiting. Genitourinary: Negative. Neurological: Positive for weakness. Psychiatric/Behavioral: Negative. Allergic/Immunologic: Negative. All other systems reviewed and are negative. Objective:       Visit Vitals  /67   Pulse 68   Temp 97.8 °F (36.6 °C)   Resp 20   Ht 5' 3\" (1.6 m)   Wt 150 lb (68 kg)   SpO2 95%   BMI 26.57 kg/m²       09/23 0701 - 09/23 1900  In: 720 [P.O.:720]  Out: -   No intake/output data recorded. Physical Exam  Vitals reviewed. Constitutional:       General: She is not in acute distress. Appearance: Normal appearance. She is well-developed and normal weight. She is not ill-appearing, toxic-appearing or diaphoretic. HENT:      Head: Normocephalic and atraumatic. Nose: Nose normal.   Eyes:      General: No scleral icterus. Conjunctiva/sclera: Conjunctivae normal.   Neck:      Vascular: No JVD. Trachea: No tracheal deviation. Cardiovascular:      Rate and Rhythm: Normal rate and regular rhythm.       Pulses:           Radial pulses are 2+ on the right side and 2+ on the left side. Heart sounds: Normal heart sounds, S1 normal and S2 normal. No murmur heard. No gallop. Pulmonary:      Effort: Pulmonary effort is normal.      Breath sounds: Normal breath sounds. No wheezing or rales. Abdominal:      General: There is no distension. Palpations: Abdomen is soft. Musculoskeletal:         General: Normal range of motion. Cervical back: Normal range of motion. Right lower leg: Edema (trace) present. Left lower leg: Edema (trace) present. Skin:     General: Skin is warm and dry. Neurological:      Mental Status: She is alert and oriented to person, place, and time. Psychiatric:         Attention and Perception: Attention and perception normal.         Mood and Affect: Mood and affect normal.         Speech: Speech normal.         Behavior: Behavior normal.         Cognition and Memory: Cognition and memory normal.         Judgment: Judgment normal.         Data Review:   Recent Labs     09/23/21  0330 09/22/21  0416 09/21/21  1813 09/21/21  1813    141   < > 140   K 3.1* 4.1   < > 2.9*   MG  --   --   --  2.2   BUN 20 15   < > 19   CREA 0.84 0.69   < > 0.91   * 113*   < > 134*   WBC 9.7  --   --  10.7   HGB 12.6  --   --  14.3   HCT 38.1  --   --  42.5     --   --  217    < > = values in this interval not displayed. Echo 9/22/2021:  Left Ventricle Normal cavity size and systolic function (ejection fraction normal). Mild concentric hypertrophy. The estimated EF is 55 - 60%. There is left ventricular diastolic dysfunction. Left Atrium Mildly dilated left atrium. Right Ventricle Normal cavity size and global systolic function. Right Atrium Normal cavity size. Aortic Valve Trileaflet valve structure, no stenosis and no regurgitation. Aortic valve peak gradient is 7 mmHg. Aortic valve mean gradient is 4 mmHg. Mitral Valve Normal valve structure and no stenosis. Mild regurgitation.    Tricuspid Valve Normal valve structure and no stenosis. Trace regurgitation. Right Ventricular Arterial Pressure (RVSP) is 27 mmHg. Pulmonic Valve Normal valve structure and no stenosis. Trace regurgitation. Aorta Normal aortic root, ascending aortic, and aortic arch. IVC/Hepatic Veins Normal structure. Normal central venous pressure (3 mmHg); IVC diameter is less than 21 mm and collapses more than 50% with respiration. Pericardium No evidence of pericardial effusion. Assessment/Plan:     Chest pain    Elevated serum troponin    NSTEMI  - ECHO showed preserved LV systolic function, no wall motion abnormality noted as above   - on metoprolol succinate, atorvastatin  - EKG abnormal with LBBB, sinus rhythm   - recommended coronary angiography, that with the patient's risk factors (HTN, HLD, DM, former tobacco) she is at risk for coronary artery disease and that coronary angiography could rule out blockages in her heart arteries  - she reports that she has been told never to take any medications which could thin her blood, I discussed at length today that the patient would need to take ASA and Plavix if she were to have a coronary artery narrowing that was treated with PCI, and that heparin a blood thinner is given during heart cath  - offered to have neurology see the patient to determine if it would be safe to given antiplatelet, or blood thinners; however, she has refused.    - at this time she refuses any therapy that would necessitate blood thinner  - monitor on telemetry while inpatient        HTN (hypertension) (9/22/2021)  - on Norvasc and metoprolol succinate   - controlled at this time       DM (diabetes mellitus) ([facility] (9/22/2021)  - glucophage held on admission   - per primary       HLD (hyperlipidemia) (9/22/2021)  - on atorvastatin      Parkinson disease ([facility] (9/22/2021)  - on carbidopa-levodopa  - per primary     Thank you for requesting cardiac evaluation and allowing us to participate in the care of this patient. We will continue to follow along with you. Please call with questions. Patient reports she had been a patient of [facility] Dr Carie Ruiz, would have her follow up with him 1-2 weeks post discharge.  Can consider further testing as outpatient

## 2021-09-23 NOTE — PROGRESS NOTES
Hospitalist Progress Note   Admit Date:  2021  5:49 PM   Name:  Whitley Castellon   Age:  70 y.o. Sex:  female  :  1950   MRN:  [id number]   Room:  Critical access hospital/    Presenting Complaint: Shortness of Breath and Anxiety    Reason(s) for Admission: Chest pain [R07.9]     Hospital Course & Interval History:   71F PMHx DM type II, HTN, HLD, PD, urinary incontinance, MDD presented  with chest discomfort and shortness of breath that had been going on for less than one day. Her chest pain was in the center (patient pointed) and felt dull and did not radiate. She does not know anything that makes it worse. No cough. No sick contacts including patients with COVID. No fevers/chills, abdominal pain, nausea/vomiting, or diarrhea. Per chart review she recently completed a course of Macrobid for uncomplicated cystitis.      ED course: EKG showed NSR with LBBB. Troponin of 184 with repeat of 163. K of 2.9. D dimer is negative. RPP is negative including COVID. Procalcitonin is negative. Given several doses of potassium. Discussed with cardiology who recommends hospitalist admission and will see in the morning. Subjective (21):  Pain controlled today. Initially no new complaints and plan for discharge. Chest pain recurred, new workup pending. Assessment & Plan:   * Chest pain  Admission ECG shows LBBB, present on prior; risk factors for CAD; Trp low, fluctuating not really progressing; procalcitonin negative; RPP (and COVID) are negative; d dimer is negative  -TTE  - cardiology consult  - jet nebs as needed    : recurred after discharge order placed. ECG, Trop, CXR pending. Cardiology evaluation in process.     HTN (hypertension)  -amlodipine 10, metoprolol 50    HLD (hyperlipidemia)  -atorvastatin 40    Parkinson disease (HCC)  -sinemet, methocarbamol, gabapentin           Dispo/Discharge Planning:      Home 1-2d    Diet:  ADULT DIET Regular  DVT PPx: SCDs  Code status: [place] Problems as of 9/23/2021 Date Reviewed: 9/23/2021        Codes Class Noted - Resolved POA    * (Principal) Chest pain ICD-10-CM: R07.9  ICD-9-CM: 786.50  9/22/2021 - Present Yes        HTN (hypertension) ICD-10-CM: I10  ICD-9-CM: 401.9  9/22/2021 - Present Yes        HLD (hyperlipidemia) ICD-10-CM: E78.5  ICD-9-CM: 272.4  9/22/2021 - Present Yes        Parkinson disease ([facility] ICD-10-CM: [place]  ICD-9-CM: 332.0  9/22/2021 - Present Yes        DM (diabetes mellitus) ([facility] ICD-10-CM: E11.9  ICD-9-CM: 250.00  9/22/2021 - Present Yes        RESOLVED: Demand ischemia ([facility] ICD-10-CM: I24.8  ICD-9-CM: 411.89  9/22/2021 - 9/23/2021 Yes        RESOLVED: Urinary incontinence ICD-10-CM: R32  ICD-9-CM: 788.30  9/22/2021 - 9/23/2021 Yes              Objective:     Patient Vitals for the past 24 hrs:   Temp Pulse Resp BP SpO2   09/23/21 1414 -- 63 22 135/67 96 %   09/23/21 1122 97.8 °F (36.6 °C) 71 20 (!) 156/64 97 %   09/23/21 0733 98 °F (36.7 °C) 65 20 (!) 145/66 94 %   09/23/21 0400 97.7 °F (36.5 °C) (!) 53 16 (!) 143/66 93 %   09/23/21 0017 98.6 °F (37 °C) 60 16 (!) 139/58 96 %   09/22/21 1930 98 °F (36.7 °C) 62 16 (!) 143/69 95 %     Oxygen Therapy  O2 Sat (%): 96 % (09/23/21 1414)  Pulse via Oximetry: 66 beats per minute (09/21/21 1900)  O2 Device: None (Room air) (09/23/21 1414)    Estimated body mass index is 26.57 kg/m² as calculated from the following:    Height as of this encounter: 5' 3\" (1.6 m). Weight as of this encounter: 68 kg (150 lb). Intake/Output Summary (Last 24 hours) at 9/23/2021 1539  Last data filed at 9/23/2021 0733  Gross per 24 hour   Intake 360 ml   Output --   Net 360 ml         Physical Exam  Vitals and nursing note reviewed. Constitutional:       General: She is not in acute distress. Appearance: Normal appearance. HENT:      Head: Normocephalic. Eyes:      Extraocular Movements: Extraocular movements intact. Cardiovascular:      Rate and Rhythm: Normal rate.       Pulses: Radial pulses are 2+ on the left side. Pulmonary:      Effort: Pulmonary effort is normal. No respiratory distress. Musculoskeletal:         General: No deformity. Cervical back: No rigidity. Skin:     General: Skin is warm and dry. Neurological:      General: No focal deficit present. Mental Status: She is alert and oriented to person, place, and time. Psychiatric:         Mood and Affect: Mood normal.         Behavior: Behavior normal.         I have reviewed ordered lab tests and independently visualized imaging below:    Last 24hr Labs:  Recent Results (from the past 24 hour(s))   METABOLIC PANEL, BASIC    Collection Time: 09/23/21  3:30 AM   Result Value Ref Range    Sodium 139 136 - 145 mmol/L    Potassium 3.1 (L) 3.5 - 5.1 mmol/L    Chloride 105 98 - 107 mmol/L    CO2 28 21 - 32 mmol/L    Anion gap 6 (L) 7 - 16 mmol/L    Glucose 181 (H) 65 - 100 mg/dL    BUN 20 8 - 23 MG/DL    Creatinine 0.84 0.6 - 1.0 MG/DL    GFR est AA >60 >60 ml/min/1.73m2    GFR est non-AA >60 >60 ml/min/1.73m2    Calcium 8.8 8.3 - 10.4 MG/DL   CBC WITH AUTOMATED DIFF    Collection Time: 09/23/21  3:30 AM   Result Value Ref Range    WBC 9.7 4.3 - 11.1 K/uL    RBC 4.07 4.05 - 5.2 M/uL    HGB 12.6 11.7 - 15.4 g/dL    HCT 38.1 35.8 - 46.3 %    MCV 93.6 79.6 - 97.8 FL    MCH 31.0 26.1 - 32.9 PG    MCHC 33.1 31.4 - 35.0 g/dL    RDW 13.2 11.9 - 14.6 %    PLATELET 682 008 - 195 K/uL    MPV 10.8 9.4 - 12.3 FL    ABSOLUTE NRBC 0.00 0.0 - 0.2 K/uL    DF AUTOMATED      NEUTROPHILS 55 43 - 78 %    LYMPHOCYTES 37 13 - 44 %    MONOCYTES 6 4.0 - 12.0 %    EOSINOPHILS 2 0.5 - 7.8 %    BASOPHILS 0 0.0 - 2.0 %    IMMATURE GRANULOCYTES 0 0.0 - 5.0 %    ABS. NEUTROPHILS 5.4 1.7 - 8.2 K/UL    ABS. LYMPHOCYTES 3.5 0.5 - 4.6 K/UL    ABS. MONOCYTES 0.6 0.1 - 1.3 K/UL    ABS. EOSINOPHILS 0.2 0.0 - 0.8 K/UL    ABS. BASOPHILS 0.0 0.0 - 0.2 K/UL    ABS. IMM.  GRANS. 0.0 0.0 - 0.5 K/UL       All Micro Results     Procedure Component Value Units Date/Time    RESPIRATORY VIRUS PANEL W/COVID-19, PCR [[id number]] Collected: 09/22/21 0046    Order Status: Completed Specimen: Nasopharyngeal Updated: 09/22/21 0346     Adenovirus NOT DETECTED        Coronavirus 229E NOT DETECTED        Coronavirus HKU1 NOT DETECTED        Coronavirus CVNL63 NOT DETECTED        Coronavirus OC43 NOT DETECTED        SARS-CoV-2, PCR NOT DETECTED        Metapneumovirus NOT DETECTED        Rhinovirus and Enterovirus NOT DETECTED        Influenza A NOT DETECTED        Influenza B NOT DETECTED        Parainfluenza 1 NOT DETECTED        Parainfluenza 2 NOT DETECTED        Parainfluenza 3 NOT DETECTED        Parainfluenza virus 4 NOT DETECTED        RSV by PCR NOT DETECTED        B. parapertussis, PCR NOT DETECTED        Bordetella pertussis - PCR NOT DETECTED        Chlamydophila pneumoniae DNA, QL, PCR NOT DETECTED        Mycoplasma pneumoniae DNA, QL, PCR NOT DETECTED       COVID-19 RAPID TEST [[id number]] Collected: 09/21/21 2051    Order Status: Completed Specimen: Nasopharyngeal Updated: 09/21/21 2122     Specimen source NASAL SWAB        COVID-19 rapid test Not detected        Comment:      The specimen is NEGATIVE for SARS-CoV-2, the novel coronavirus associated with COVID-19. A negative result does not rule out COVID-19. This test has been authorized by the FDA under an Emergency Use Authorization (EUA) for use by authorized laboratories. Fact sheet for Healthcare Providers: ConventionUpdate.co.nz  Fact sheet for Patients: ConventionUpdate.co.nz       Methodology: Isothermal Nucleic Acid Amplification               Other Studies:  XR CHEST SNGL V    Result Date: 9/23/2021  AP PORTABLE CHEST X-RAY 9/23/2021 3:01 PM HISTORY: chest pain  Chest pain and shortness of breath for less than 1 day COMPARISON: 9/21/2021 FINDINGS:  Bilateral distal small pulmonary nodules are unchanged and may be calcified granulomata.  Surgical clips are present along the right chest wall. There is no lobar consolidation, pleural effusions or pulmonary edema. No consolidation.       Current Meds:  Current Facility-Administered Medications   Medication Dose Route Frequency    sodium chloride (NS) flush 5-40 mL  5-40 mL IntraVENous Q8H    sodium chloride (NS) flush 5-40 mL  5-40 mL IntraVENous PRN    acetaminophen (TYLENOL) tablet 650 mg  650 mg Oral Q6H PRN    Or    acetaminophen (TYLENOL) suppository 650 mg  650 mg Rectal Q6H PRN    polyethylene glycol (MIRALAX) packet 17 g  17 g Oral DAILY PRN    ondansetron (ZOFRAN ODT) tablet 4 mg  4 mg Oral Q8H PRN    Or    ondansetron (ZOFRAN) injection 4 mg  4 mg IntraVENous Q6H PRN    enoxaparin (LOVENOX) injection 40 mg  40 mg SubCUTAneous DAILY    carbidopa-levodopa (SINEMET)  mg per tablet 0.5 Tablet  0.5 Tablet Oral TID    atorvastatin (LIPITOR) tablet 40 mg  40 mg Oral QHS    amLODIPine (NORVASC) tablet 10 mg  10 mg Oral DAILY    albuterol (PROVENTIL VENTOLIN) nebulizer solution 1.25 mg  1.25 mg Nebulization Q6H PRN    gabapentin (NEURONTIN) capsule 300 mg  300 mg Oral BID    metoprolol succinate (TOPROL-XL) XL tablet 50 mg  50 mg Oral DAILY    sodium chloride (NS) flush 5-10 mL  5-10 mL IntraVENous Q8H    sodium chloride (NS) flush 5-10 mL  5-10 mL IntraVENous PRN       Signed:  David Hankins MD

## 2021-09-23 NOTE — PROGRESS NOTES
Problem: Mobility Impaired (Adult and Pediatric)  Goal: *Acute Goals and Plan of Care (Insert Text)  Outcome: Progressing Towards Goal  Note:   LTG:  (1.)Ms. Levar Harrison will move from supine to sit and sit to supine  in bed with STAND BY ASSIST within 7 treatment day(s). (2.)Ms. Levar Harrison will transfer from bed to chair and chair to bed with STAND BY ASSIST using the least restrictive device within 7 treatment day(s). Goal met  (3.)Ms. Levar Harrison will ambulate with STAND BY ASSIST for 100 feet with the least restrictive device within 7 treatment day(s). goal met  (4)Ms. Levar Harrison will go up 3 steps with rails CGA in 7 days. (5)Ms. Levar Harrison will perform HEP with cues and assistance to increase safety on her feet in 7 days. New goals 9/23 - pt. Will transfer supervision and ambulate supervision with  feet in 7 days. ________________________________________________________________________________________________      PHYSICAL THERAPY: Daily Note and AM 9/23/2021  OBSERVATION: PT Visit Days : 2  [place] / [place] B / Product Type: Medicare /       NAME/AGE/GENDER: Hazen Boast is a 70 y.o. female   PRIMARY DIAGNOSIS: Chest pain [R07.9] Chest pain Chest pain       ICD-10: Treatment Diagnosis:    Generalized Muscle Weakness (M62.81)  Other abnormalities of gait and mobility (R26.89)   Precaution/Allergies:  Codeine and Prednisone      ASSESSMENT:     Ms. Levar Harrison presents with decreased functional mobility and general weakness. She lives with her sister and is normally independent with a rollator. She reports some decreased balance and right ankle issues and she normally wears a brace. She reports feeling much better today and hopes to maybe go home today. She moved much better today and was able to ambulate in the gudino with RW SBA. She uses a right ankle brace and chronically limps on right ankle. She did some exercises in the chair. She plans to go home with sister.   She does not want HHPT. She has a ramp to get in house. No questions. This section established at most recent assessment   PROBLEM LIST (Impairments causing functional limitations):  Decreased Strength  Decreased ADL/Functional Activities  Decreased Transfer Abilities  Decreased Ambulation Ability/Technique  Decreased Balance  Decreased Activity Tolerance  Increased Fatigue  Decreased Upson with Home Exercise Program  Decreased Cognition   INTERVENTIONS PLANNED: (Benefits and precautions of physical therapy have been discussed with the patient.)  Balance Exercise  Bed Mobility  Family Education  Gait Training  Home Exercise Program (HEP)  Range of Motion (ROM)  Therapeutic Activites  Therapeutic Exercise/Strengthening  Transfer Training     TREATMENT PLAN: Frequency/Duration: daily for duration of hospital stay  Rehabilitation Potential For Stated Goals: Good     REHAB RECOMMENDATIONS (at time of discharge pending progress):    Placement:  HHPT  Equipment:   None at this time              HISTORY:   History of Present Injury/Illness (Reason for Referral):  69 yo CF with past history of DM type II, HTN, HLD, PD, urinary incontinance, MDD presents with chest discomfort and shortness of breath that has been going on for less than one day. Her chest pain is in the center (patient pointed) and feels dull and does not radiate. She does not know anything that makes it worse. No cough. No sick contacts including patients with COVID. No fevers/chills, abdominal pain, nausea/vomiting, or diarrhea. Per chart review she recently completed a course of Macrobid for uncomplicated cystitis. ED course: EKG shows NSR with LBBB. Troponin of 184 with repeat of 163. K of 2.9. D dimer is negative. RPP is negative including COVID. Procalcitonin is negative. Given several doses of potassium. Discussed with cardiology who recommends hospitalist admission and will see in the morning.    Past Medical History/Comorbidities:   Ms. Tawny Arana has a past medical history of Anxiety, Cancer ([facility] Depression, Diabetes ([facility] Heart failure ([facility] Hypertension, [facility]'s disease ([facility] Peripheral neuropathy, Polyarthritis, and Sjogren's disease ([facility] She also has no past medical history of Nicotine vapor product user or Non-nicotine vapor product user. Ms. Ashley Arias  has a past surgical history that includes hx appendectomy; hx cholecystectomy; and hx total vaginal hysterectomy. Social History/Living Environment:   Home Environment: Trailer/mobile home  # Steps to Enter: 3  Rails to Enter: Yes  [employer] : Right  One/Two Story Residence: One story  Living Alone: No  Support Systems:  (sister lives with her)  Patient Expects to be Discharged to[de-identified] Trailer/mobile home  Current DME Used/Available at Home: Shower chair, Walker, rollator  Tub or Shower Type: Shower  Prior Level of Function/Work/Activity:  Independent, rollator     Number of Personal Factors/Comorbidities that affect the Plan of Care: 1-2: MODERATE COMPLEXITY   EXAMINATION:   Most Recent Physical Functioning:   Gross Assessment:  AROM: Generally decreased, functional (LE's, right ankle limited)  Strength: Generally decreased, functional (LE's, right ankle limited)               Posture:     Balance:  Sitting: Intact  Standing: With support Bed Mobility:  Supine to Sit: Stand-by assistance  Wheelchair Mobility:     Transfers:  Sit to Stand: Stand-by assistance  Stand to Sit: Stand-by assistance  Bed to Chair: Stand-by assistance  Duration: 25 Minutes  Gait:     Speed/Orly: Delayed  Step Length: Left shortened;Right shortened  Stance: Right decreased  Gait Abnormalities: Antalgic  Distance (ft): 100 Feet (ft)  Assistive Device: Walker, rolling  Ambulation - Level of Assistance: Stand-by assistance  Interventions: Safety awareness training;Verbal cues      Body Structures Involved:  Bones  Joints  Muscles  Ligaments Body Functions Affected:   Movement Related Activities and Participation Affected: Mobility   Number of elements that affect the Plan of Care: 3: MODERATE COMPLEXITY   CLINICAL PRESENTATION:   Presentation: Stable and uncomplicated: LOW COMPLEXITY   CLINICAL DECISION MAKIN [facility] [address] [address] AM-PAC 6 Clicks   Basic Mobility Inpatient Short Form  How much difficulty does the patient currently have. .. Unable A Lot A Little None   1. Turning over in bed (including adjusting bedclothes, sheets and blankets)? [] 1   [] 2   [x] 3   [] 4   2. Sitting down on and standing up from a chair with arms ( e.g., wheelchair, bedside commode, etc.)   [] 1   [] 2   [x] 3   [] 4   3. Moving from lying on back to sitting on the side of the bed? [] 1   [] 2   [x] 3   [] 4   How much help from another person does the patient currently need. .. Total A Lot A Little None   4. Moving to and from a bed to a chair (including a wheelchair)? [] 1   [] 2   [x] 3   [] 4   5. Need to walk in hospital room? [] 1   [] 2   [x] 3   [] 4   6. Climbing 3-5 steps with a railing? [] 1   [x] 2   [] 3   [] 4   © , Trustees of [facility] [address] [id number], under license to [employer]. All rights reserved      Score:  Initial: 17 Most Recent: X (Date: -- )    Interpretation of Tool:  Represents activities that are increasingly more difficult (i.e. Bed mobility, Transfers, Gait). Medical Necessity:     Patient is expected to demonstrate progress in   strength, range of motion, and balance   to   decrease assistance required with exercises and functional mobility  . Reason for Services/Other Comments:  Patient   continues to require present interventions due to patient's inability to perform exercises and functional mobility independently  .    Use of outcome tool(s) and clinical judgement create a POC that gives a: Questionable prediction of patient's progress: MODERATE COMPLEXITY            TREATMENT:   (In addition to Assessment/Re-Assessment sessions the following treatments were rendered)   Pre-treatment Symptoms/Complaints:  Feels better  Pain: Initial:      Post Session:  right ankle     Therapeutic Activity: (  25 Minutes ):  Therapeutic activities including Bed transfers, Chair transfers, Ambulation on level ground, and standing with RW and ankle pumps, seated march, long arc quads, hip abduction, straight leg raise x 15 each to improve mobility, strength, and balance. Required minimal Safety awareness training;Verbal cues to promote static and dynamic balance in standing. Braces/Orthotics/Lines/Etc:   O2 Device: None (Room air)  Treatment/Session Assessment:    Response to Treatment:  Did better  Interdisciplinary Collaboration:   Registered Nurse  After treatment position/precautions:   Up in chair  Bed alarm/tab alert on  Bed/Chair-wheels locked  Bed in low position  Call light within reach   Compliance with Program/Exercises: Compliant most of the time  Recommendations/Intent for next treatment session: \"Next visit will focus on advancements to more challenging activities and reduction in assistance provided\".   Total Treatment Duration:  PT Patient Time In/Time Out  Time In: 1055  Time Out: [address], PT

## 2021-09-24 VITALS
HEART RATE: 61 BPM | TEMPERATURE: 97.8 F | BODY MASS INDEX: 26.58 KG/M2 | DIASTOLIC BLOOD PRESSURE: 58 MMHG | WEIGHT: 150 LBS | SYSTOLIC BLOOD PRESSURE: 116 MMHG | HEIGHT: 63 IN | OXYGEN SATURATION: 93 % | RESPIRATION RATE: 18 BRPM

## 2021-09-24 PROBLEM — R77.8 TROPONIN LEVEL ELEVATED: Status: ACTIVE | Noted: 2021-09-24

## 2021-09-24 PROBLEM — I20.8 STABLE ANGINA PECTORIS (HCC): Chronic | Status: ACTIVE | Noted: 2021-09-24

## 2021-09-24 PROBLEM — R07.9 CHEST PAIN: Status: RESOLVED | Noted: 2021-09-22 | Resolved: 2021-09-24

## 2021-09-24 LAB
ANION GAP SERPL CALC-SCNC: 7 MMOL/L (ref 7–16)
ATRIAL RATE: 59 BPM
BASOPHILS # BLD: 0 K/UL (ref 0–0.2)
BASOPHILS NFR BLD: 0 % (ref 0–2)
BUN SERPL-MCNC: 18 MG/DL (ref 8–23)
CALCIUM SERPL-MCNC: 9.1 MG/DL (ref 8.3–10.4)
CALCULATED P AXIS, ECG09: 58 DEGREES
CALCULATED R AXIS, ECG10: -41 DEGREES
CALCULATED T AXIS, ECG11: 139 DEGREES
CHLORIDE SERPL-SCNC: 103 MMOL/L (ref 98–107)
CO2 SERPL-SCNC: 28 MMOL/L (ref 21–32)
CREAT SERPL-MCNC: 0.79 MG/DL (ref 0.6–1)
DIAGNOSIS, 93000: NORMAL
DIFFERENTIAL METHOD BLD: NORMAL
EOSINOPHIL # BLD: 0.2 K/UL (ref 0–0.8)
EOSINOPHIL NFR BLD: 1 % (ref 0.5–7.8)
ERYTHROCYTE [DISTWIDTH] IN BLOOD BY AUTOMATED COUNT: 13 % (ref 11.9–14.6)
GLUCOSE SERPL-MCNC: 140 MG/DL (ref 65–100)
HCT VFR BLD AUTO: 39.1 % (ref 35.8–46.3)
HGB BLD-MCNC: 13.1 G/DL (ref 11.7–15.4)
IMM GRANULOCYTES # BLD AUTO: 0 K/UL (ref 0–0.5)
IMM GRANULOCYTES NFR BLD AUTO: 0 % (ref 0–5)
LYMPHOCYTES # BLD: 3.3 K/UL (ref 0.5–4.6)
LYMPHOCYTES NFR BLD: 32 % (ref 13–44)
MCH RBC QN AUTO: 30.5 PG (ref 26.1–32.9)
MCHC RBC AUTO-ENTMCNC: 33.5 G/DL (ref 31.4–35)
MCV RBC AUTO: 90.9 FL (ref 79.6–97.8)
MONOCYTES # BLD: 0.7 K/UL (ref 0.1–1.3)
MONOCYTES NFR BLD: 7 % (ref 4–12)
NEUTS SEG # BLD: 6.3 K/UL (ref 1.7–8.2)
NEUTS SEG NFR BLD: 60 % (ref 43–78)
NRBC # BLD: 0 K/UL (ref 0–0.2)
P-R INTERVAL, ECG05: 150 MS
PLATELET # BLD AUTO: 210 K/UL (ref 150–450)
PMV BLD AUTO: 10.8 FL (ref 9.4–12.3)
POTASSIUM SERPL-SCNC: 3.6 MMOL/L (ref 3.5–5.1)
Q-T INTERVAL, ECG07: 492 MS
QRS DURATION, ECG06: 120 MS
QTC CALCULATION (BEZET), ECG08: 487 MS
RBC # BLD AUTO: 4.3 M/UL (ref 4.05–5.2)
SODIUM SERPL-SCNC: 138 MMOL/L (ref 136–145)
VENTRICULAR RATE, ECG03: 59 BPM
WBC # BLD AUTO: 10.5 K/UL (ref 4.3–11.1)

## 2021-09-24 PROCEDURE — 65270000029 HC RM PRIVATE

## 2021-09-24 PROCEDURE — 74011250637 HC RX REV CODE- 250/637: Performed by: INTERNAL MEDICINE

## 2021-09-24 PROCEDURE — 99218 HC RM OBSERVATION: CPT

## 2021-09-24 PROCEDURE — 80048 BASIC METABOLIC PNL TOTAL CA: CPT

## 2021-09-24 PROCEDURE — 74011250636 HC RX REV CODE- 250/636: Performed by: INTERNAL MEDICINE

## 2021-09-24 PROCEDURE — 96372 THER/PROPH/DIAG INJ SC/IM: CPT

## 2021-09-24 PROCEDURE — 36415 COLL VENOUS BLD VENIPUNCTURE: CPT

## 2021-09-24 PROCEDURE — 97530 THERAPEUTIC ACTIVITIES: CPT

## 2021-09-24 PROCEDURE — 85025 COMPLETE CBC W/AUTO DIFF WBC: CPT

## 2021-09-24 PROCEDURE — 99232 SBSQ HOSP IP/OBS MODERATE 35: CPT | Performed by: INTERNAL MEDICINE

## 2021-09-24 RX ADMIN — Medication 10 ML: at 05:25

## 2021-09-24 RX ADMIN — GABAPENTIN 300 MG: 300 CAPSULE ORAL at 10:45

## 2021-09-24 RX ADMIN — METOPROLOL SUCCINATE 50 MG: 50 TABLET, EXTENDED RELEASE ORAL at 10:45

## 2021-09-24 RX ADMIN — AMLODIPINE BESYLATE 10 MG: 10 TABLET ORAL at 10:45

## 2021-09-24 RX ADMIN — ENOXAPARIN SODIUM 40 MG: 40 INJECTION SUBCUTANEOUS at 10:43

## 2021-09-24 RX ADMIN — CARBIDOPA AND LEVODOPA 0.5 TABLET: 25; 100 TABLET ORAL at 10:45

## 2021-09-24 NOTE — PROGRESS NOTES
Physician Progress Note      PATIENT:               Osvaldo Koo [facility]  CSN #:                  [id number]  :                       1950  ADMIT DATE:       2021 5:49 PM  [address] [place] DATE:  RESPONDING  PROVIDER #:        Kishore Santana MD          QUERY TEXT:    Patient admitted with chest pain. Cardiology documented NSTEMI. Problem list contains dx of demand ischemia. If possible, please document in the progress notes and discharge summary if you are evaluating and/or treating any of the following: The medical record reflects the following:  Risk Factors: anxiety, LBBB, HTN, HLD, DM, hypokalemia  Clinical Indicators: chest pain, shortness of breath, elevated troponin 164 -> 199  Treatment: cardiology consult, patient refused cardiac cath, telemetry, metoprolol and atorvastatin,  Options provided:  -- NSTEMI  -- Type 2 MI  -- Demand Ischemia with MI  -- Demand Ischemia only, no MI  -- Unstable Angina  -- Chest pain due to, # (please indicate cause)  -- Other - I will add my own diagnosis  -- Disagree - Not applicable / Not valid  -- Disagree - Clinically unable to determine / Unknown  -- Refer to Clinical Documentation Reviewer    PROVIDER RESPONSE TEXT:    Stable angina already added to problem list    Query created by: López Hardy on 2021 2:38 PM      QUERY TEXT:    Pt admitted with chest pain and hypokalemia and has CHF documented. If possible, please document in progress notes and discharge summary further specificity regarding the type and acuity of CHF:    The medical record reflects the following:  Risk Factors: documented hx of CHF, HTN, DM, HLD  Clinical Indicators:  ECHO with left ventricular diastolic dysfunction.  EF 55-60%  Treatment: home meds of Aldactone, Metoprolol, Norvasc, I&O  Options provided:  -- Chronic Systolic CHF/HFrEF  -- Chronic Diastolic CHF/HFpEF  -- Chronic Systolic and Diastolic CHF  -- Other - I will add my own diagnosis  -- Disagree - Not applicable / Not valid  -- Disagree - Clinically unable to determine / Unknown  -- Refer to Clinical Documentation Reviewer    PROVIDER RESPONSE TEXT:    This patient has chronic diastolic CHF/HFpEF. Query created by:  Susan Mcneal on 9/24/2021 3:37 PM      Electronically signed by:  Audelia Lesch MD 9/24/2021 3:53 PM

## 2021-09-24 NOTE — PROGRESS NOTES
Care Management Interventions  PCP Verified by CM: Yes  Mode of Transport at Discharge: Self  Physical Therapy Consult: Yes  Support Systems: Other Family Member(s)  Discharge Location  Discharge Placement: Home    Patient screened for d/c planning needs. I met patient walking around in her room and fully dressed waiting to be discharged. She denies any d/c needs and declined HHPT. She has a walker at home if needed. No immediate needs identified.

## 2021-09-24 NOTE — PROGRESS NOTES
[facility] CARDIOLOGY PROGRESS NOTE           9/24/2021 4:37 PM    Admit Date: 9/21/2021      Subjective:   No complaints of any chest pain today. She says she feels substantially better and says she really wants to go home. Denies any worsening dyspnea or palpitations. ROS:  Cardiovascular:  As noted above    Objective:      Vitals:    09/23/21 1932 09/23/21 2257 09/24/21 0242 09/24/21 0757   BP: (!) 145/69 139/61 131/65 (!) 116/58   Pulse: 68 75 64 61   Resp: 20 18 16 18   Temp: 97.9 °F (36.6 °C) 98.2 °F (36.8 °C) 98.3 °F (36.8 °C) 97.8 °F (36.6 °C)   SpO2: 96% 96% 95% 93%   Weight:       Height:           Physical Exam:  General-No Acute Distress  Neck- supple, no JVD  CV- regular rate and rhythm no MRG  Lung- clear bilaterally  Abd- soft, nontender, nondistended  Ext- no edema bilaterally. Skin- warm and dry    Data Review:   Recent Labs     09/24/21  0346 09/23/21  0330 09/22/21  0416 09/21/21  1813    139   < > 140   K 3.6 3.1*   < > 2.9*   MG  --   --   --  2.2   BUN 18 20   < > 19   CREA 0.79 0.84   < > 0.91   * 181*   < > 134*   WBC 10.5 9.7  --  10.7   HGB 13.1 12.6  --  14.3   HCT 39.1 38.1  --  42.5    195  --  217    < > = values in this interval not displayed. Peak high-sensitivity troponin from this admission was 199.7-trended down to 164.2. Assessment/Plan:     Active Problems:    Parkinson disease ([facility] (9/22/2021)      HTN (hypertension) (9/22/2021)        DM (diabetes mellitus) ([facility] (9/22/2021)          HLD (hyperlipidemia) (9/22/2021)        Troponin level elevated (9/24/2021)      -NSTEMI-  - ECHO showed preserved LV systolic function, no wall motion abnormality noted  - on metoprolol succinate, atorvastatin and amlodipine.    -  LBBB-noted on EKG-Per patient she has had an abnormal EKG for a while.    We have recommended coronary angiography,  considering that her risk factors (HTN, HLD, DM, former tobacco) she is at risk for coronary artery disease and that coronary angiography could rule out obstructive disease and potentially prevent complications including death.  -We explained to her that if she had obstructive disease, she would benefit from being on aspirin/Plavix/dual antiplatelet therapy but with her history of intracranial aneurysms, she is not interested as she was told by previous physician/neurologist that if she were to ever be on aspirin, she would die from an intracranial hemorrhage. We offered to have neurology see her to determine if it would be safe to given antiplatelet, or blood thinners; however, she has refused.   -She now really wants to go home and is not interested in any other options from a cardiac perspective.    -Stable angina-she does happen to have stable angina at baseline but now since she has progressive angina it is consistent with unstable angina and with the elevated troponin-consistent with a non-STEMI. She is already on dual antianginal therapy. Continue statin therapy although the addition of antiplatelet therapy would be beneficial for her and she has refused. HTN (hypertension) (9/22/2021)  - on Norvasc and metoprolol succinate   -Reasonably well controlled at this time -some slightly high readings but she believes that this is due to anxiety.       DM (diabetes mellitus) ([facility] (9/22/2021)  - glucophage held on admission -can be restarted  - per primary        HLD (hyperlipidemia) (9/22/2021)  - on atorvastatin-high-dose statin therapy-appropriate       Parkinson disease ([facility] (9/22/2021)  - on carbidopa-levodopa    Plan: As mentioned above, even though she has findings consistent with a non-STEMI with an elevated troponin with multiple risk factors for obstructive coronary artery disease and we have recommended coronary angiography, she has decided against this as she does not want to take any antiplatelet therapy whatsoever.  Not interested in a neurology consult inpatient to determine bleeding risk overall. Her primary cardiologist is Dr. Inderjit mckenzie at [facility] and she is willing to follow-up with him on an outpatient basis. Fortunately LV function is stable with an EF greater than 60% on this admission.  -She was told that if she had any unstable symptoms to take as needed sublingual nitroglycerin and of course call 911/EMS. -We will sign off for now. Thank you for allowing us to participate in the care of your patient.     Staci Menjivar MD  9/24/2021 4:37 PM

## 2021-09-24 NOTE — PROGRESS NOTES
Problem: Mobility Impaired (Adult and Pediatric)  Goal: *Acute Goals and Plan of Care (Insert Text)  Outcome: Progressing Towards Goal  Note:   LTG:  (1.)Ms. Anne Borrego will move from supine to sit and sit to supine  in bed with STAND BY ASSIST within 7 treatment day(s). (2.)Ms. Anne Borrego will transfer from bed to chair and chair to bed with STAND BY ASSIST using the least restrictive device within 7 treatment day(s). Goal met  (3.)Ms. Anne Borrego will ambulate with STAND BY ASSIST for 100 feet with the least restrictive device within 7 treatment day(s). goal met  (4)Ms. Anne Borrego will go up 3 steps with rails CGA in 7 days. (5)Ms. Anne Borrego will perform HEP with cues and assistance to increase safety on her feet in 7 days. New goals 9/23 - pt. Will transfer supervision and ambulate supervision with  feet in 7 days. ________________________________________________________________________________________________      PHYSICAL THERAPY: Daily Note and AM 9/24/2021  INPATIENT: PT Visit Days : 3  [place] / [place] / Product Type: Medicare /       NAME/AGE/GENDER: Maxine Phillips is a 70 y.o. female   PRIMARY DIAGNOSIS: Chest pain [R07.9]  Troponin level elevated [R77.8] Chest pain Chest pain       ICD-10: Treatment Diagnosis:    Generalized Muscle Weakness (M62.81)  Other abnormalities of gait and mobility (R26.89)   Precaution/Allergies:  Codeine and Prednisone      ASSESSMENT:     Ms. Anne Borrego presents with decreased functional mobility and general weakness. She lives with her sister and is normally independent with a rollator. She reports some decreased balance and right ankle issues and she normally wears a brace. She reports feeling much better today and hopes to maybe go home today. She moved much better today and was able to ambulate in the gudino with RW SBA. She uses a right ankle brace and chronically limps on right ankle. She did some exercises in the chair.   She plans to go home with sister. She does not want HHPT. She has a ramp to get in house. No questions. 9/24- pt up on side of bed on contact, she is agreeable to move with therapy-might go home today. We worked on walking in [facility], hand held assist to just steady pt, progressed with distance today. She had on her ankle brace. Back in room she walked into the bathroom to void and then walked over to chair. Worked on a few leg exercises-long arc quads, straight leg raises and hip ABD/ADD. Pt stayed up in chair with needs in reach. Overall doing well with mobility. This section established at most recent assessment   PROBLEM LIST (Impairments causing functional limitations):  Decreased Strength  Decreased ADL/Functional Activities  Decreased Transfer Abilities  Decreased Ambulation Ability/Technique  Decreased Balance  Decreased Activity Tolerance  Increased Fatigue  Decreased [place] with Home Exercise Program  Decreased Cognition   INTERVENTIONS PLANNED: (Benefits and precautions of physical therapy have been discussed with the patient.)  Balance Exercise  Bed Mobility  Family Education  Gait Training  Home Exercise Program (HEP)  Range of Motion (ROM)  Therapeutic Activites  Therapeutic Exercise/Strengthening  Transfer Training     TREATMENT PLAN: Frequency/Duration: daily for duration of hospital stay  Rehabilitation Potential For Stated Goals: Good     REHAB RECOMMENDATIONS (at time of discharge pending progress):    Placement:  HHPT  Equipment:   None at this time              HISTORY:   History of Present Injury/Illness (Reason for Referral):  71 yo CF with past history of DM type II, HTN, HLD, PD, urinary incontinance, MDD presents with chest discomfort and shortness of breath that has been going on for less than one day. Her chest pain is in the center (patient pointed) and feels dull and does not radiate. She does not know anything that makes it worse. No cough.  No sick contacts including patients with COVID. No fevers/chills, abdominal pain, nausea/vomiting, or diarrhea. Per chart review she recently completed a course of Macrobid for uncomplicated cystitis. ED course: EKG shows NSR with LBBB. Troponin of 184 with repeat of 163. K of 2.9. D dimer is negative. RPP is negative including COVID. Procalcitonin is negative. Given several doses of potassium. Discussed with cardiology who recommends hospitalist admission and will see in the morning. Past Medical History/Comorbidities:   Ms. Alysia Toro  has a past medical history of Anxiety, Cancer ([facility] [address] Chest pain (9/22/2021), Demand ischemia ([facility] [address] (9/22/2021), Depression, Diabetes ([facility] [address] Heart failure ([facility] [address] Hypertension, Parkinson's disease ([facility] [address] Peripheral neuropathy, Polyarthritis, Sjogren's disease ([facility] [address] and Urinary incontinence (9/22/2021). She also has no past medical history of Nicotine vapor product user or Non-nicotine vapor product user. Ms. Alysia Toro  has a past surgical history that includes hx appendectomy; hx cholecystectomy; and hx total vaginal hysterectomy. Social History/Living Environment:   Home Environment: Trailer/mobile home  # Steps to Enter: 3  Rails to Enter: Yes  [employer] : Right  One/Two Story Residence: One story  Living Alone: No  Support Systems: Other Family Member(s)  Patient Expects to be Discharged to[de-identified] Trailer/mobile home  Current DME Used/Available at Home: Shower chair, Walker, rollator  Tub or Shower Type: Shower  Prior Level of Function/Work/Activity:  Independent, rollator     Number of Personal Factors/Comorbidities that affect the Plan of Care: 1-2: MODERATE COMPLEXITY   EXAMINATION:   Most Recent Physical Functioning:   Gross Assessment:                  Posture:     Balance:  Sitting: Intact  Standing: Intact; With support Bed Mobility:     Wheelchair Mobility:     Transfers:  Sit to Stand: Supervision  Stand to Sit: Supervision  Gait:     Speed/Orly: Delayed  Step Length: Left shortened;Right shortened  Stance: Right decreased  Gait Abnormalities: Antalgic  Distance (ft): 150 Feet (ft)  Assistive Device:  (hand held assist)  Ambulation - Level of Assistance: Stand-by assistance;Contact guard assistance  Interventions: Safety awareness training;Verbal cues      Body Structures Involved:  Bones  Joints  Muscles  Ligaments Body Functions Affected: Movement Related Activities and Participation Affected: Mobility   Number of elements that affect the Plan of Care: 3: MODERATE COMPLEXITY   CLINICAL PRESENTATION:   Presentation: Stable and uncomplicated: LOW COMPLEXITY   CLINICAL DECISION MAKIN[address] [place] [address] AM-PAC 6 Clicks   Basic Mobility Inpatient Short Form  How much difficulty does the patient currently have. .. Unable A Lot A Little None   1. Turning over in bed (including adjusting bedclothes, sheets and blankets)? [] 1   [] 2   [x] 3   [] 4   2. Sitting down on and standing up from a chair with arms ( e.g., wheelchair, bedside commode, etc.)   [] 1   [] 2   [x] 3   [] 4   3. Moving from lying on back to sitting on the side of the bed? [] 1   [] 2   [x] 3   [] 4   How much help from another person does the patient currently need. .. Total A Lot A Little None   4. Moving to and from a bed to a chair (including a wheelchair)? [] 1   [] 2   [x] 3   [] 4   5. Need to walk in hospital room? [] 1   [] 2   [x] 3   [] 4   6. Climbing 3-5 steps with a railing? [] 1   [x] 2   [] 3   [] 4   © , Trustees of [address] [place], [place] [id number], under license to [employer]. All rights reserved      Score:  Initial: 17 Most Recent: X (Date: -- )    Interpretation of Tool:  Represents activities that are increasingly more difficult (i.e. Bed mobility, Transfers, Gait). Medical Necessity:     Patient is expected to demonstrate progress in   strength, range of motion, and balance   to   decrease assistance required with exercises and functional mobility  .   Reason for Services/Other Comments:  Patient   continues to require present interventions due to patient's inability to perform exercises and functional mobility independently  . Use of outcome tool(s) and clinical judgement create a POC that gives a: Questionable prediction of patient's progress: MODERATE COMPLEXITY            TREATMENT:   (In addition to Assessment/Re-Assessment sessions the following treatments were rendered)   Pre-treatment Symptoms/Complaints:  Feels better  Pain: Initial:   Pain Intensity 1: 0  Post Session:  No reports of pain     Therapeutic Activity: (    ):  Therapeutic activities including Bed transfers, toilet transfers, Chair transfers, Ambulation on level ground, long arc quads, hip abduction, straight leg raise x 10 each to improve mobility, strength, and balance. Required minimal Safety awareness training;Verbal cues to promote static and dynamic balance in standing. Braces/Orthotics/Lines/Etc:   O2 Device: None (Room air)  Treatment/Session Assessment:    Response to Treatment:  Doing well  Interdisciplinary Collaboration:   Registered Nurse  After treatment position/precautions:   Up in chair  Bed/Chair-wheels locked  Call light within reach   Compliance with Program/Exercises: Compliant most of the time  Recommendations/Intent for next treatment session: \"Next visit will focus on advancements to more challenging activities and reduction in assistance provided\".   Total Treatment Duration:  PT Patient Time In/Time Out  Time In: 1038  Time Out: Tad 63, PT

## 2021-09-24 NOTE — DISCHARGE INSTRUCTIONS
Patient Education        Learning About Managing Stable Angina  What is stable angina? Angina is a symptom of coronary artery disease. For most people, it feels like chest pain or pressure. Some people feel other symptoms. These symptoms include pain, pressure, or a strange feeling in the back, neck, jaw, or upper belly, or in one or both shoulders or arms. The most common types of angina are stable angina and unstable angina. Stable angina means that you can usually predict when your symptoms will happen. You probably know what things cause your angina. (With unstable angina, your symptoms have changed. They don't follow your typical pattern of stable angina.)  Most people who have stable angina can manage their symptoms. This includes taking medicines as prescribed and knowing when to call the doctor or get help right away. It also includes paying attention to your symptoms so you can see what causes them and what is typical for you. What can you do to manage angina? Be aware of your symptoms  Tracking when and why your angina symptoms happen is one way to understand your angina better. It also helps you know what's normal for you. If you know what's normal for you, it'll be easier to tell if you have a change in symptoms that means it's time to call for help. Understanding your normal patterns may also help you make some changes that might prevent or reduce symptoms. To track your symptoms, write down:  · The day and time of day you notice symptoms. · What you were doing when you had them:  ? Exercising or doing a physical activity? ? Eating a large meal?  ? Spending time outside in very cold weather? ? Feeling a lot of stress? ? Something else? · How long the symptoms lasted. · What you did to help your symptoms. Work with your doctor  You and your doctor can use your symptoms tracking information to talk about whether you need any changes to your angina treatment.  For example, you may decide to use medicine or to change your medicine. Or you may talk about other treatments you could try. Most people who have stable angina can control their symptoms by taking prescribed medicines, including nitroglycerin, when needed. Balance activity and rest  Staying active and knowing when to rest during activity are also important. Here are some tips that might help you manage your angina. · Ease into your day. · Warm up slowly before activity. Talk to your doctor about a level of activity that is safe for you. · Give yourself time to rest and digest right after meals. · Change the way you eat. Eat smaller meals more often during the day instead of two or three large meals. · If an activity causes angina, stop and rest. Be active at a level that does not cause symptoms. When should you call for help? Call 911 anytime you think you may need emergency care. For example, call if:  · You passed out (lost consciousness). · You have symptoms of a heart attack. These may include:  ? Chest pain or pressure, or a strange feeling in the chest.  ? Sweating. ? Shortness of breath. ? Nausea or vomiting. ? Pain, pressure, or a strange feeling in the back, neck, jaw, or upper belly or in one or both shoulders or arms. ? Lightheadedness or sudden weakness. ? A fast or irregular heartbeat. After you call 911, the  may tell you to chew 1 adult-strength or 2 to 4 low-dose aspirin. Wait for an ambulance. Do not try to drive yourself. · You have angina symptoms that do not go away with rest or are not getting better within 5 minutes after you take a dose of nitroglycerin. Call your doctor now if:  · Your angina symptoms seem worse but still follow your typical pattern. You can predict when symptoms will happen, but they may come on sooner, feel worse, or last longer. · You feel dizzy or lightheaded, or you feel like you may faint.   Watch closely for changes in your health, and be sure to contact your doctor if you have any problems. Follow-up care is a key part of your treatment and safety. Be sure to make and go to all appointments, and call your doctor if you are having problems. It's also a good idea to know your test results and keep a list of the medicines you take. Where can you learn more? Go to http://www.gray.com/  Enter V574 in the search box to learn more about \"Learning About Managing Stable Angina. \"  Current as of: April 29, 2021               Content Version: 13.0  © 9772-1637 [employer]. Care instructions adapted under license by [employer] (which disclaims liability or warranty for this information). If you have questions about a medical condition or this instruction, always ask your healthcare professional. [address] any warranty or liability for your use of this information.